# Patient Record
Sex: FEMALE | Race: WHITE | NOT HISPANIC OR LATINO
[De-identification: names, ages, dates, MRNs, and addresses within clinical notes are randomized per-mention and may not be internally consistent; named-entity substitution may affect disease eponyms.]

---

## 2017-11-29 PROBLEM — Z00.00 ENCOUNTER FOR PREVENTIVE HEALTH EXAMINATION: Status: ACTIVE | Noted: 2017-11-29

## 2017-12-14 ENCOUNTER — APPOINTMENT (OUTPATIENT)
Dept: OBGYN | Facility: CLINIC | Age: 67
End: 2017-12-14

## 2018-02-18 ENCOUNTER — EMERGENCY (EMERGENCY)
Facility: HOSPITAL | Age: 68
LOS: 0 days | Discharge: HOME | End: 2018-02-18
Attending: EMERGENCY MEDICINE

## 2018-02-18 VITALS
TEMPERATURE: 97 F | SYSTOLIC BLOOD PRESSURE: 134 MMHG | HEART RATE: 116 BPM | DIASTOLIC BLOOD PRESSURE: 87 MMHG | OXYGEN SATURATION: 99 %

## 2018-02-18 VITALS — OXYGEN SATURATION: 99 % | DIASTOLIC BLOOD PRESSURE: 96 MMHG | SYSTOLIC BLOOD PRESSURE: 128 MMHG | HEART RATE: 88 BPM
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
## 2018-02-18 DIAGNOSIS — S01.01XA LACERATION WITHOUT FOREIGN BODY OF SCALP, INITIAL ENCOUNTER: ICD-10-CM

## 2018-02-18 DIAGNOSIS — Z23 ENCOUNTER FOR IMMUNIZATION: ICD-10-CM

## 2018-02-18 DIAGNOSIS — S06.9X9A UNSPECIFIED INTRACRANIAL INJURY WITH LOSS OF CONSCIOUSNESS OF UNSPECIFIED DURATION, INITIAL ENCOUNTER: ICD-10-CM
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
## 2018-02-18 LAB
ALBUMIN SERPL ELPH-MCNC: 4.1 G/DL — SIGNIFICANT CHANGE UP (ref 3–5.5)
ALP SERPL-CCNC: 86 U/L — SIGNIFICANT CHANGE UP (ref 30–115)
ALT FLD-CCNC: 15 U/L — SIGNIFICANT CHANGE UP (ref 0–41)
ANION GAP SERPL CALC-SCNC: 9 MMOL/L — SIGNIFICANT CHANGE UP (ref 7–14)
APTT BLD: 17.7 SEC — CRITICAL LOW (ref 27–39.2)
AST SERPL-CCNC: 17 U/L — SIGNIFICANT CHANGE UP (ref 0–41)
BILIRUB SERPL-MCNC: 0.9 MG/DL — SIGNIFICANT CHANGE UP (ref 0.2–1.2)
BUN SERPL-MCNC: 16 MG/DL — SIGNIFICANT CHANGE UP (ref 10–20)
CALCIUM SERPL-MCNC: 9.4 MG/DL — SIGNIFICANT CHANGE UP (ref 8.5–10.1)
CHLORIDE SERPL-SCNC: 106 MMOL/L — SIGNIFICANT CHANGE UP (ref 98–110)
CO2 SERPL-SCNC: 21 MMOL/L — SIGNIFICANT CHANGE UP (ref 17–32)
CREAT SERPL-MCNC: 0.6 MG/DL — LOW (ref 0.7–1.5)
ETHANOL SERPL-MCNC: 7 MG/DL — HIGH
GLUCOSE SERPL-MCNC: 95 MG/DL — SIGNIFICANT CHANGE UP (ref 70–110)
INR BLD: 1.05 RATIO — SIGNIFICANT CHANGE UP (ref 0.65–1.3)
POTASSIUM SERPL-MCNC: 4.8 MMOL/L — SIGNIFICANT CHANGE UP (ref 3.5–5)
POTASSIUM SERPL-SCNC: 4.8 MMOL/L — SIGNIFICANT CHANGE UP (ref 3.5–5)
PROT SERPL-MCNC: 6.8 G/DL — SIGNIFICANT CHANGE UP (ref 6–8)
PROTHROM AB SERPL-ACNC: 11.4 SEC — SIGNIFICANT CHANGE UP (ref 9.95–12.87)
SODIUM SERPL-SCNC: 136 MMOL/L — SIGNIFICANT CHANGE UP (ref 135–146)
TYPE + AB SCN PNL BLD: SIGNIFICANT CHANGE UP

## 2018-02-18 RX ORDER — SODIUM CHLORIDE 9 MG/ML
1000 INJECTION INTRAMUSCULAR; INTRAVENOUS; SUBCUTANEOUS ONCE
Qty: 0 | Refills: 0 | Status: COMPLETED | OUTPATIENT
Start: 2018-02-18 | End: 2018-02-18

## 2018-02-18 RX ORDER — ONDANSETRON 8 MG/1
4 TABLET, FILM COATED ORAL ONCE
Qty: 0 | Refills: 0 | Status: COMPLETED | OUTPATIENT
Start: 2018-02-18 | End: 2018-02-18

## 2018-02-18 RX ORDER — TETANUS TOXOID, REDUCED DIPHTHERIA TOXOID AND ACELLULAR PERTUSSIS VACCINE, ADSORBED 5; 2.5; 8; 8; 2.5 [IU]/.5ML; [IU]/.5ML; UG/.5ML; UG/.5ML; UG/.5ML
0.5 SUSPENSION INTRAMUSCULAR ONCE
Qty: 0 | Refills: 0 | Status: COMPLETED | OUTPATIENT
Start: 2018-02-18 | End: 2018-02-18

## 2018-02-18 RX ADMIN — SODIUM CHLORIDE 1000 MILLILITER(S): 9 INJECTION INTRAMUSCULAR; INTRAVENOUS; SUBCUTANEOUS at 15:46

## 2018-02-18 RX ADMIN — ONDANSETRON 4 MILLIGRAM(S): 8 TABLET, FILM COATED ORAL at 11:11

## 2018-02-18 NOTE — ED PROVIDER NOTE - PROGRESS NOTE DETAILS
obsereved in ED for several hours, cough resolved, breaht sounds are nml and oxygen is nml. no signs of ards or respiratory distress, she tolerated po here and scans are nml . will d/c

## 2018-02-18 NOTE — ED PROVIDER NOTE - NS ED ROS FT
Review of Systems:  	•	CONSTITUTIONAL - no fever, no diaphoresis, no chills  	•	EYES - no eye pain, no blurry vision  	•	ENT - no change in hearing, no sore throat, no ear pain or tinnitus  	•	RESPIRATORY - no shortness of breath, no cough  	•	CARDIAC - no chest pain, no palpitations  	•	GI - no abd pain, no nausea, no vomiting, no diarrhea, no constipation  	•	MUSCULOSKELETAL - no joint paint, no swelling, no redness  	•	NEUROLOGIC - +LOC, head injury

## 2018-02-18 NOTE — ED ADULT NURSE NOTE - OBJECTIVE STATEMENT
Pt s/p fall from [facility], pt biligerant at this time, ETOH intox, refusing IV line and CT Scan

## 2018-02-18 NOTE — ED PROVIDER NOTE - MEDICAL DECISION MAKING DETAILS
hx of mental health disorder unspecified, smoker had fall today backwards but patient denies it, she states she was eating bagel at the time, there was reported LOC, afterwards there was vomiting, and patient was spitting up her bagel, here she continues to cough and spit up particles of food, there is some rhonchi, she is aox3, follows commands she has lac to posterior forehead, no neck pain, xray shows lucency under left diaphragm, given luceny and vomiting will get head ct, c spine, and abd pelvis, patient likely has aspirated so will admit for monitoring of ards. hx of mental health disorder unspecified, smoker had fall today backwards but patient denies it, she states she was eating bagel at the time, there was reported LOC, afterwards there was vomiting, and patient was spitting up her bagel, here she continues to cough and spit up particles of food, there is some rhonchi, she is aox3, follows commands she has lac to posterior forehead, no neck pain, xray shows lucency under left diaphragm, given luceny and vomiting will get head ct, c spine, and abd pelvis, patient is being watched for 4-6 hrs if no hypoxia and breathign improved will d/c but if patient becomes hypoxic or has trouble breathing will consider admission.

## 2018-02-18 NOTE — ED ADULT NURSE REASSESSMENT NOTE - NS ED NURSE REASSESS COMMENT FT1
Pt BIBA s/p fall, ETOH, non compliant at this time, pt refusing all treatment, refusing labs and refusing all medications, pt educated and still refusing care at this time.

## 2018-02-18 NOTE — ED PROVIDER NOTE - PHYSICAL EXAMINATION
Alert, NAD, WDWN, non-toxic appearing  PERRL, EOMI, normal pupils, no icterus, normal external ENT, pink/moist membranes  Airway intact, Lungs CTAB, normal resp effort w/o tachypnea, speaking full sentences, no wheezing or rhonchi  CVS1S2, RRR, no m/g/r, no JVD, 2+ pulses b/l, no LE edema or unilateral swelling, warm/well-perfused  Abdomen soft, no tender/dist/guard/rebound, no CVA tenderness  FROM all 4 ext, no bony tenderness or obvious deformities, no calf tenderness  Skin warm/dry, +occipital skin avulsion  AAOx3, CN 2-12 intact, normal motor, normal sensory, normal gait

## 2018-02-18 NOTE — ED PROVIDER NOTE - OBJECTIVE STATEMENT
67 yr old female, [facility] mental health disorder and dementia, presenting with mechanical fall, head injury to the back of the head, BIBA from [facility], +LOC as per witness at the NH, denies any blood thinners, patient is screaming in triage, refusing all interventions, denies headache, neck pain, chest pain, abd pain, N/V/D, or dizziness

## 2018-05-09 ENCOUNTER — APPOINTMENT (OUTPATIENT)
Dept: OBGYN | Facility: CLINIC | Age: 68
End: 2018-05-09

## 2018-12-07 ENCOUNTER — INPATIENT (INPATIENT)
Facility: HOSPITAL | Age: 68
LOS: 6 days | End: 2018-12-14
Attending: INTERNAL MEDICINE | Admitting: INTERNAL MEDICINE

## 2018-12-07 VITALS — HEART RATE: 66 BPM | OXYGEN SATURATION: 100 %

## 2018-12-07 LAB
ALBUMIN SERPL ELPH-MCNC: 3.8 G/DL — SIGNIFICANT CHANGE UP (ref 3.5–5.2)
ALP SERPL-CCNC: 99 U/L — SIGNIFICANT CHANGE UP (ref 30–115)
ALT FLD-CCNC: 28 U/L — SIGNIFICANT CHANGE UP (ref 0–41)
ANION GAP SERPL CALC-SCNC: 26 MMOL/L — HIGH (ref 7–14)
APPEARANCE UR: CLEAR — SIGNIFICANT CHANGE UP
APTT BLD: 28.7 SEC — SIGNIFICANT CHANGE UP (ref 27–39.2)
AST SERPL-CCNC: 41 U/L — SIGNIFICANT CHANGE UP (ref 0–41)
BASE EXCESS BLDA CALC-SCNC: -4.8 MMOL/L — LOW (ref -2–2)
BASOPHILS # BLD AUTO: 0.06 K/UL — SIGNIFICANT CHANGE UP (ref 0–0.2)
BASOPHILS NFR BLD AUTO: 0.7 % — SIGNIFICANT CHANGE UP (ref 0–1)
BILIRUB SERPL-MCNC: <0.2 MG/DL — SIGNIFICANT CHANGE UP (ref 0.2–1.2)
BILIRUB UR-MCNC: NEGATIVE — SIGNIFICANT CHANGE UP
BUN SERPL-MCNC: 11 MG/DL — SIGNIFICANT CHANGE UP (ref 10–20)
CALCIUM SERPL-MCNC: 8.7 MG/DL — SIGNIFICANT CHANGE UP (ref 8.5–10.1)
CHLORIDE SERPL-SCNC: 99 MMOL/L — SIGNIFICANT CHANGE UP (ref 98–110)
CK MB CFR SERPL CALC: 11.7 NG/ML — HIGH (ref 0.6–6.3)
CK SERPL-CCNC: 555 U/L — HIGH (ref 0–225)
CO2 SERPL-SCNC: 16 MMOL/L — LOW (ref 17–32)
COLOR SPEC: YELLOW — SIGNIFICANT CHANGE UP
CREAT SERPL-MCNC: 0.9 MG/DL — SIGNIFICANT CHANGE UP (ref 0.7–1.5)
DIFF PNL FLD: ABNORMAL
EOSINOPHIL # BLD AUTO: 0.11 K/UL — SIGNIFICANT CHANGE UP (ref 0–0.7)
EOSINOPHIL NFR BLD AUTO: 1.4 % — SIGNIFICANT CHANGE UP (ref 0–8)
GAS PNL BLDA: SIGNIFICANT CHANGE UP
GAS PNL BLDA: SIGNIFICANT CHANGE UP
GAS PNL BLDV: SIGNIFICANT CHANGE UP
GAS PNL BLDV: SIGNIFICANT CHANGE UP
GLUCOSE BLDC GLUCOMTR-MCNC: 152 MG/DL — HIGH (ref 70–99)
GLUCOSE SERPL-MCNC: 226 MG/DL — HIGH (ref 70–99)
GLUCOSE UR QL: 100 MG/DL
HCO3 BLDA-SCNC: 22 MMOL/L — LOW (ref 23–27)
HCT VFR BLD CALC: 38 % — SIGNIFICANT CHANGE UP (ref 37–47)
HCT VFR BLD CALC: 38.2 % — SIGNIFICANT CHANGE UP (ref 37–47)
HGB BLD-MCNC: 12.3 G/DL — SIGNIFICANT CHANGE UP (ref 12–16)
HGB BLD-MCNC: 13.1 G/DL — SIGNIFICANT CHANGE UP (ref 12–16)
IMM GRANULOCYTES NFR BLD AUTO: 2.7 % — HIGH (ref 0.1–0.3)
INR BLD: 1.11 RATIO — SIGNIFICANT CHANGE UP (ref 0.65–1.3)
KETONES UR-MCNC: ABNORMAL
LACTATE SERPL-SCNC: 1.7 MMOL/L — SIGNIFICANT CHANGE UP (ref 0.5–2.2)
LEUKOCYTE ESTERASE UR-ACNC: NEGATIVE — SIGNIFICANT CHANGE UP
LIDOCAIN IGE QN: 40 U/L — SIGNIFICANT CHANGE UP (ref 7–60)
LYMPHOCYTES # BLD AUTO: 3.16 K/UL — SIGNIFICANT CHANGE UP (ref 1.2–3.4)
LYMPHOCYTES # BLD AUTO: 38.9 % — SIGNIFICANT CHANGE UP (ref 20.5–51.1)
MCHC RBC-ENTMCNC: 30.5 PG — SIGNIFICANT CHANGE UP (ref 27–31)
MCHC RBC-ENTMCNC: 30.7 PG — SIGNIFICANT CHANGE UP (ref 27–31)
MCHC RBC-ENTMCNC: 32.4 G/DL — SIGNIFICANT CHANGE UP (ref 32–37)
MCHC RBC-ENTMCNC: 34.3 G/DL — SIGNIFICANT CHANGE UP (ref 32–37)
MCV RBC AUTO: 89 FL — SIGNIFICANT CHANGE UP (ref 81–99)
MCV RBC AUTO: 94.8 FL — SIGNIFICANT CHANGE UP (ref 81–99)
MONOCYTES # BLD AUTO: 0.69 K/UL — HIGH (ref 0.1–0.6)
MONOCYTES NFR BLD AUTO: 8.5 % — SIGNIFICANT CHANGE UP (ref 1.7–9.3)
NEUTROPHILS # BLD AUTO: 3.89 K/UL — SIGNIFICANT CHANGE UP (ref 1.4–6.5)
NEUTROPHILS NFR BLD AUTO: 47.8 % — SIGNIFICANT CHANGE UP (ref 42.2–75.2)
NITRITE UR-MCNC: NEGATIVE — SIGNIFICANT CHANGE UP
NRBC # BLD: 0 /100 WBCS — SIGNIFICANT CHANGE UP (ref 0–0)
PCO2 BLDA: 46 MMHG — HIGH (ref 38–42)
PH BLDA: 7.29 — LOW (ref 7.38–7.42)
PH UR: 6 — SIGNIFICANT CHANGE UP (ref 5–8)
PLATELET # BLD AUTO: 230 K/UL — SIGNIFICANT CHANGE UP (ref 130–400)
PLATELET # BLD AUTO: 246 K/UL — SIGNIFICANT CHANGE UP (ref 130–400)
PO2 BLDA: 86 MMHG — SIGNIFICANT CHANGE UP (ref 78–95)
POTASSIUM SERPL-MCNC: 4.4 MMOL/L — SIGNIFICANT CHANGE UP (ref 3.5–5)
POTASSIUM SERPL-SCNC: 4.4 MMOL/L — SIGNIFICANT CHANGE UP (ref 3.5–5)
PROT SERPL-MCNC: 6.3 G/DL — SIGNIFICANT CHANGE UP (ref 6–8)
PROT UR-MCNC: 100 MG/DL
PROTHROM AB SERPL-ACNC: 12.8 SEC — SIGNIFICANT CHANGE UP (ref 9.95–12.87)
RBC # BLD: 4.01 M/UL — LOW (ref 4.2–5.4)
RBC # BLD: 4.29 M/UL — SIGNIFICANT CHANGE UP (ref 4.2–5.4)
RBC # FLD: 12.8 % — SIGNIFICANT CHANGE UP (ref 11.5–14.5)
RBC # FLD: 12.9 % — SIGNIFICANT CHANGE UP (ref 11.5–14.5)
SAO2 % BLDA: 95 % — SIGNIFICANT CHANGE UP (ref 94–98)
SODIUM SERPL-SCNC: 141 MMOL/L — SIGNIFICANT CHANGE UP (ref 135–146)
SP GR SPEC: 1.02 — SIGNIFICANT CHANGE UP (ref 1.01–1.03)
TROPONIN T SERPL-MCNC: 0.07 NG/ML — CRITICAL HIGH
TROPONIN T SERPL-MCNC: <0.01 NG/ML — SIGNIFICANT CHANGE UP
TYPE + AB SCN PNL BLD: SIGNIFICANT CHANGE UP
UROBILINOGEN FLD QL: 0.2 MG/DL — SIGNIFICANT CHANGE UP (ref 0.2–0.2)
WBC # BLD: 18.59 K/UL — HIGH (ref 4.8–10.8)
WBC # BLD: 8.13 K/UL — SIGNIFICANT CHANGE UP (ref 4.8–10.8)
WBC # FLD AUTO: 18.59 K/UL — HIGH (ref 4.8–10.8)
WBC # FLD AUTO: 8.13 K/UL — SIGNIFICANT CHANGE UP (ref 4.8–10.8)

## 2018-12-07 RX ORDER — OLANZAPINE 15 MG/1
0 TABLET, FILM COATED ORAL
Qty: 0 | Refills: 0 | COMMUNITY

## 2018-12-07 RX ORDER — HALOPERIDOL DECANOATE 100 MG/ML
2 INJECTION INTRAMUSCULAR
Qty: 0 | Refills: 0 | Status: DISCONTINUED | OUTPATIENT
Start: 2018-12-07 | End: 2018-12-08

## 2018-12-07 RX ORDER — DOCUSATE SODIUM 100 MG
1 CAPSULE ORAL
Qty: 0 | Refills: 0 | COMMUNITY

## 2018-12-07 RX ORDER — DICLOFENAC SODIUM 30 MG/G
0 GEL TOPICAL
Qty: 0 | Refills: 0 | COMMUNITY

## 2018-12-07 RX ORDER — HALOPERIDOL DECANOATE 100 MG/ML
2 INJECTION INTRAMUSCULAR
Qty: 0 | Refills: 0 | Status: DISCONTINUED | OUTPATIENT
Start: 2018-12-07 | End: 2018-12-07

## 2018-12-07 RX ORDER — ENOXAPARIN SODIUM 100 MG/ML
40 INJECTION SUBCUTANEOUS DAILY
Qty: 0 | Refills: 0 | Status: DISCONTINUED | OUTPATIENT
Start: 2018-12-07 | End: 2018-12-12

## 2018-12-07 RX ORDER — PANTOPRAZOLE SODIUM 20 MG/1
40 TABLET, DELAYED RELEASE ORAL
Qty: 0 | Refills: 0 | Status: DISCONTINUED | OUTPATIENT
Start: 2018-12-07 | End: 2018-12-08

## 2018-12-07 RX ORDER — CITALOPRAM 10 MG/1
1 TABLET, FILM COATED ORAL
Qty: 0 | Refills: 0 | COMMUNITY

## 2018-12-07 RX ORDER — BENZTROPINE MESYLATE 1 MG
0 TABLET ORAL
Qty: 0 | Refills: 0 | COMMUNITY

## 2018-12-07 RX ORDER — MELOXICAM 15 MG/1
1 TABLET ORAL
Qty: 0 | Refills: 0 | COMMUNITY

## 2018-12-07 RX ORDER — MIDAZOLAM HYDROCHLORIDE 1 MG/ML
0.02 INJECTION, SOLUTION INTRAMUSCULAR; INTRAVENOUS
Qty: 100 | Refills: 0 | Status: DISCONTINUED | OUTPATIENT
Start: 2018-12-07 | End: 2018-12-08

## 2018-12-07 RX ORDER — SODIUM CHLORIDE 9 MG/ML
1000 INJECTION INTRAMUSCULAR; INTRAVENOUS; SUBCUTANEOUS ONCE
Qty: 0 | Refills: 0 | Status: COMPLETED | OUTPATIENT
Start: 2018-12-07 | End: 2018-12-07

## 2018-12-07 RX ORDER — PANTOPRAZOLE SODIUM 20 MG/1
1 TABLET, DELAYED RELEASE ORAL
Qty: 0 | Refills: 0 | COMMUNITY

## 2018-12-07 RX ORDER — CITALOPRAM 10 MG/1
0 TABLET, FILM COATED ORAL
Qty: 0 | Refills: 0 | COMMUNITY

## 2018-12-07 RX ORDER — SIMVASTATIN 20 MG/1
0 TABLET, FILM COATED ORAL
Qty: 0 | Refills: 0 | COMMUNITY

## 2018-12-07 RX ORDER — SODIUM CHLORIDE 9 MG/ML
1000 INJECTION, SOLUTION INTRAVENOUS ONCE
Qty: 0 | Refills: 0 | Status: COMPLETED | OUTPATIENT
Start: 2018-12-07 | End: 2018-12-07

## 2018-12-07 RX ORDER — HALOPERIDOL DECANOATE 100 MG/ML
0 INJECTION INTRAMUSCULAR
Qty: 0 | Refills: 0 | COMMUNITY

## 2018-12-07 RX ORDER — OLANZAPINE 15 MG/1
5 TABLET, FILM COATED ORAL
Qty: 0 | Refills: 0 | Status: DISCONTINUED | OUTPATIENT
Start: 2018-12-07 | End: 2018-12-10

## 2018-12-07 RX ORDER — DOCUSATE SODIUM 100 MG
100 CAPSULE ORAL
Qty: 0 | Refills: 0 | Status: DISCONTINUED | OUTPATIENT
Start: 2018-12-07 | End: 2018-12-08

## 2018-12-07 RX ORDER — BENZTROPINE MESYLATE 1 MG
1 TABLET ORAL
Qty: 0 | Refills: 0 | Status: DISCONTINUED | OUTPATIENT
Start: 2018-12-07 | End: 2018-12-10

## 2018-12-07 RX ORDER — HALOPERIDOL DECANOATE 100 MG/ML
75 INJECTION INTRAMUSCULAR
Qty: 0 | Refills: 0 | COMMUNITY

## 2018-12-07 RX ORDER — HALOPERIDOL DECANOATE 100 MG/ML
2 INJECTION INTRAMUSCULAR
Qty: 0 | Refills: 0 | COMMUNITY

## 2018-12-07 RX ORDER — SIMVASTATIN 20 MG/1
1 TABLET, FILM COATED ORAL
Qty: 0 | Refills: 0 | COMMUNITY

## 2018-12-07 RX ORDER — MELOXICAM 15 MG/1
0 TABLET ORAL
Qty: 0 | Refills: 0 | COMMUNITY

## 2018-12-07 RX ORDER — ONDANSETRON 8 MG/1
4 TABLET, FILM COATED ORAL ONCE
Qty: 0 | Refills: 0 | Status: COMPLETED | OUTPATIENT
Start: 2018-12-07 | End: 2018-12-09

## 2018-12-07 RX ORDER — FENTANYL CITRATE 50 UG/ML
0.5 INJECTION INTRAVENOUS
Qty: 2500 | Refills: 0 | Status: DISCONTINUED | OUTPATIENT
Start: 2018-12-07 | End: 2018-12-08

## 2018-12-07 RX ORDER — CHOLECALCIFEROL (VITAMIN D3) 125 MCG
1 CAPSULE ORAL
Qty: 0 | Refills: 0 | COMMUNITY

## 2018-12-07 RX ORDER — SIMVASTATIN 20 MG/1
40 TABLET, FILM COATED ORAL AT BEDTIME
Qty: 0 | Refills: 0 | Status: DISCONTINUED | OUTPATIENT
Start: 2018-12-07 | End: 2018-12-11

## 2018-12-07 RX ORDER — BENZTROPINE MESYLATE 1 MG
1 TABLET ORAL
Qty: 0 | Refills: 0 | COMMUNITY

## 2018-12-07 RX ORDER — OLANZAPINE 15 MG/1
1 TABLET, FILM COATED ORAL
Qty: 0 | Refills: 0 | COMMUNITY

## 2018-12-07 RX ORDER — CHOLECALCIFEROL (VITAMIN D3) 125 MCG
1000 CAPSULE ORAL DAILY
Qty: 0 | Refills: 0 | Status: DISCONTINUED | OUTPATIENT
Start: 2018-12-07 | End: 2018-12-11

## 2018-12-07 RX ORDER — CITALOPRAM 10 MG/1
20 TABLET, FILM COATED ORAL DAILY
Qty: 0 | Refills: 0 | Status: DISCONTINUED | OUTPATIENT
Start: 2018-12-07 | End: 2018-12-10

## 2018-12-07 RX ADMIN — Medication 1 MILLIGRAM(S): at 19:50

## 2018-12-07 RX ADMIN — Medication 1000 UNIT(S): at 19:50

## 2018-12-07 RX ADMIN — SODIUM CHLORIDE 1000 MILLILITER(S): 9 INJECTION, SOLUTION INTRAVENOUS at 12:00

## 2018-12-07 RX ADMIN — ENOXAPARIN SODIUM 40 MILLIGRAM(S): 100 INJECTION SUBCUTANEOUS at 19:49

## 2018-12-07 RX ADMIN — SODIUM CHLORIDE 1000 MILLILITER(S): 9 INJECTION INTRAMUSCULAR; INTRAVENOUS; SUBCUTANEOUS at 09:14

## 2018-12-07 RX ADMIN — FENTANYL CITRATE 3.5 MICROGRAM(S)/KG/HR: 50 INJECTION INTRAVENOUS at 09:13

## 2018-12-07 RX ADMIN — MIDAZOLAM HYDROCHLORIDE 1.4 MG/KG/HR: 1 INJECTION, SOLUTION INTRAMUSCULAR; INTRAVENOUS at 09:13

## 2018-12-07 RX ADMIN — MIDAZOLAM HYDROCHLORIDE 1.4 MG/KG/HR: 1 INJECTION, SOLUTION INTRAMUSCULAR; INTRAVENOUS at 18:00

## 2018-12-07 RX ADMIN — SIMVASTATIN 40 MILLIGRAM(S): 20 TABLET, FILM COATED ORAL at 21:10

## 2018-12-07 RX ADMIN — CITALOPRAM 20 MILLIGRAM(S): 10 TABLET, FILM COATED ORAL at 19:50

## 2018-12-07 RX ADMIN — OLANZAPINE 5 MILLIGRAM(S): 15 TABLET, FILM COATED ORAL at 19:50

## 2018-12-07 RX ADMIN — HALOPERIDOL DECANOATE 2 MILLIGRAM(S): 100 INJECTION INTRAMUSCULAR at 21:57

## 2018-12-07 NOTE — CHART NOTE - NSCHARTNOTEFT_GEN_A_CORE
Call to bedside for tonic colonic seizure like activity undetermined amount of time associated with 1 ep on non billious non bloody vomitus.  Will followup Neuro cs, BMP, CBC, ABG, CXR, CTHnoncon, REEG, and ekg.  Patient will be made NPO with low continues wall suction, Increased versed, with aspiration precautions, and frequent neuro checks. seizure precautions.  Plan discussed with senior.     ICU Vital Signs Last 24 Hrs  T(C): 36.4 (07 Dec 2018 18:04), Max: 36.4 (07 Dec 2018 18:04)  T(F): 97.6 (07 Dec 2018 18:04), Max: 97.6 (07 Dec 2018 18:04)  HR: 78 (07 Dec 2018 21:30) (62 - 125)  BP: 118/54 (07 Dec 2018 21:30) (88/60 - 145/66)  BP(mean): 76 (07 Dec 2018 21:30) (63 - 84)  RR: 17 (07 Dec 2018 21:30) (17 - 23)  SpO2: 100% (07 Dec 2018 21:30) (99% - 100%)    PHYSICAL EXAM:  General: sedated , intubated     HEENT: Pupils equal, reactive to light symmetrically.    PULM: Clear to auscultation bilaterally, no significant sputum production.    CVS: Regular rate and rhythm, no murmurs, rubs, or gallops.    ABD: Soft, nondistended, nontender, no masses.    EXT: No edema, nontender.    SKIN: Warm and well perfused, no rashes noted. Call to bedside for tonic colonic seizure like activity undetermined amount of time associated with 1 ep on non billious non bloody vomitus.  Will followup Neuro cs, BMP, mag, phos CBC, CE,  ABG, CXR, CTHnoncon, REEG, and ekg.  Patient will be made NPO with low continues wall suction, Increased versed, with aspiration precautions, and frequent neuro checks. seizure precautions.  Plan discussed with senior. Spoke with neurology team will start Keppra IV 1g BID and VEEG in AM.     ICU Vital Signs Last 24 Hrs  T(C): 36.4 (07 Dec 2018 18:04), Max: 36.4 (07 Dec 2018 18:04)  T(F): 97.6 (07 Dec 2018 18:04), Max: 97.6 (07 Dec 2018 18:04)  HR: 78 (07 Dec 2018 21:30) (62 - 125)  BP: 118/54 (07 Dec 2018 21:30) (88/60 - 145/66)  BP(mean): 76 (07 Dec 2018 21:30) (63 - 84)  RR: 17 (07 Dec 2018 21:30) (17 - 23)  SpO2: 100% (07 Dec 2018 21:30) (99% - 100%)    PHYSICAL EXAM:  General: sedated , intubated     HEENT: Pupils equal, reactive to light symmetrically.    PULM: Clear to auscultation bilaterally, no significant sputum production.    CVS: Regular rate and rhythm, no murmurs, rubs, or gallops.    ABD: Soft, nondistended, nontender, no masses.    EXT: No edema, nontender.    SKIN: Warm and well perfused, no rashes noted.

## 2018-12-07 NOTE — ED PROVIDER NOTE - ATTENDING CONTRIBUTION TO CARE
I was present for and supervised the key and critical aspects of the procedures performed during the care of the patient. Patient is a 66 yo f who presents unresponsive in cardiac arrest, s/p cpr and intubation prehospital after a choking episode, s/p heimlich.   Patient received 1 dose of epi but per medics once intubated rosc achieved. on physical exam vital signs stable, patient intubated, rrr s1s2 noted, ext- patient intubated unable to fully comply with neuro exam based on intubation  A/P- patient was choking at her facility, heimlich performed, cpr performed pre-hospital who intubated and achieved rosc after 1 does of epi and intubation. esitmated down time 10 min,   Patient stable at this time. we placed patient on monitor, iv, given sedation we confirmed tube placement, obtain cxr, and will admit for further management in addition started iv clindamycin for potential aspiration

## 2018-12-07 NOTE — ED ADULT NURSE REASSESSMENT NOTE - NS ED NURSE REASSESS COMMENT FT1
pt transported to icu with RN, respiratory, and transport.  PT drips infusing as per order. Rass score (-1).  ET tube in place, moody in place.  pt transported safely.

## 2018-12-07 NOTE — H&P ADULT - PMH
Depressed    GERD (gastroesophageal reflux disease)    High cholesterol    HTN (hypertension)    Schizophrenia    Tardive dyskinesia Depressed    GERD (gastroesophageal reflux disease)    High cholesterol    Schizophrenia    Tardive dyskinesia

## 2018-12-07 NOTE — H&P ADULT - HISTORY OF PRESENT ILLNESS
66 y/o F, with PMH listed below including HTN and schizophrenia, brought to the ED by EMS after cardiac arrest. On day of presentation, she was eating a bagel when she choked and collapsed. Heimlich maneuver was performed and CPR was initiated as she was pulseless. EMS arrived after around 10 mins. ROSC was established after pushing 1 dose of epi. She was intubated on site and brought to ED. in 66 y/o F, with PMH listed below including HTN and schizophrenia, lives at [facility], brought to the ED by EMS after cardiac arrest. On day of presentation, she was eating a bagel when she choked and collapsed. Heimlich maneuver was performed and CPR was initiated as she was pulseless. EMS arrived after around 10 mins. ROSC was established after pushing 1 dose of epi. She was intubated on site and brought to ED.   spoke to patients cousin's wife (cousin is HCP), patient is a chronic smoker 1ppd for many years.

## 2018-12-07 NOTE — CONSULT NOTE ADULT - SUBJECTIVE AND OBJECTIVE BOX
Patient is a 67y old  Female who presents with a chief complaint of cardiac arrest.    HPI: 68 y/o F, with HTN and schizophrenia, brought to the ED by EMS after cardiac arrest. On day of presentation, she was eating a bagel when she choked and collapsed. Heimlich maneuver was performed and CPR was initiated. EMS arrived after around 10 mins. ROSC was established after pushing 1 dose of epi. She was intubated on site.        PAST MEDICAL & SURGICAL HISTORY:  Depressed  High cholesterol  Tardive dyskinesia  Schizophrenia  GERD (gastroesophageal reflux disease)  HTN (hypertension)  No significant past surgical history      SOCIAL HX:   Smoking     neg                    ETOH       neg                     Other   neg    FAMILY HISTORY:  No pertinent family history in first degree relatives      ROS:  See HPI     Allergies  No Known Allergies            PHYSICAL EXAM    ICU Vital Signs Last 24 Hrs  T(C): 34.8 (07 Dec 2018 13:10), Max: 35.1 (07 Dec 2018 09:30)  T(F): 94.6 (07 Dec 2018 13:10), Max: 95.2 (07 Dec 2018 09:30)  HR: 75 (07 Dec 2018 13:10) (62 - 125)  BP: 138/63 (07 Dec 2018 13:10) (88/60 - 145/66)  BP(mean): 63 (07 Dec 2018 13:10) (63 - 63)  RR: 17 (07 Dec 2018 13:10) (17 - 20)  SpO2: 100% (07 Dec 2018 09:30) (100% - 100%)      General: intubated   HEENT:  RANDALL              Lymph Nodes: No cervical LN   Lungs: Bilateral BS, clear, no wheezing, no rhonchi  Cardiovascular: Regular  Abdomen: Soft, Positive BS  Extremities: No clubbing  Skin: Warm  Neurological: Non focal, positive Gag and pupillary reflexes        LABS:                          12.3   8.13  )-----------( 230      ( 07 Dec 2018 08:47 )             38.0                                               12-    141  |  99  |  11  ----------------------------<  226<H>  4.4   |  16<L>  |  0.9    Ca    8.7      07 Dec 2018 08:47    TPro  6.3  /  Alb  3.8  /  TBili  <0.2  /  DBili  x   /  AST  41  /  ALT  28  /  AlkPhos  99  12-07      PT/INR - ( 07 Dec 2018 08:47 )   PT: 12.80 sec;   INR: 1.11 ratio         PTT - ( 07 Dec 2018 08:47 )  PTT:28.7 sec                                       Urinalysis Basic - ( 07 Dec 2018 08:47 )    Color: Yellow / Appearance: Clear / S.020 / pH: x  Gluc: x / Ketone: Trace  / Bili: Negative / Urobili: 0.2 mg/dL   Blood: x / Protein: 100 mg/dL / Nitrite: Negative   Leuk Esterase: Negative / RBC: 6-10 /HPF / WBC x   Sq Epi: x / Non Sq Epi: Occasional /HPF / Bacteria: x        CARDIAC MARKERS ( 07 Dec 2018 08:47 )  x     / <0.01 ng/mL / x     / x     / x                                                LIVER FUNCTIONS - ( 07 Dec 2018 08:47 )  Alb: 3.8 g/dL / Pro: 6.3 g/dL / ALK PHOS: 99 U/L / ALT: 28 U/L / AST: 41 U/L / GGT: x                                                                                               Mode: AC/ CMV (Assist Control/ Continuous Mandatory Ventilation)  RR (machine): 16  TV (machine): 450  FiO2: 40  PEEP: 5  ITime: 1  MAP: 16  PIP: 45                                      ABG - ( 07 Dec 2018 08:54 )  pH, Arterial: 7.26  pH, Blood: x     /  pCO2: 28    /  pO2: 382   / HCO3: 12    / Base Excess: -13.0 /  SaO2: 100                 X-Rays      ET ok                  elevated left hemidiaphragm                  otherwise unremarkable                                                                             MEDICATIONS  (STANDING):  fentaNYL   Infusion. 0.5 MICROgram(s)/kG/Hr (3.5 mL/Hr) IV Continuous <Continuous>  lactated ringers Bolus 1000 milliLiter(s) IV Bolus once  midazolam Infusion 0.02 mG/kG/Hr (1.4 mL/Hr) IV Continuous <Continuous>    MEDICATIONS  (PRN):

## 2018-12-07 NOTE — PROVIDER CONTACT NOTE (OTHER) - SITUATION
Upon admission to ICU, pt presents w/ seizure-like convulsions & involuntary jerking of arms and legs & eyes opening wide during episodes

## 2018-12-07 NOTE — H&P ADULT - NSHPPHYSICALEXAM_GEN_ALL_CORE
T(F): 94.6 (12-07-18 @ 13:10), Max: 95.2 (12-07-18 @ 09:30)  HR: 75 (12-07-18 @ 13:10) (62 - 125)  BP: 138/63 (12-07-18 @ 13:10) (88/60 - 145/66)  RR: 17 (12-07-18 @ 13:10) (17 - 20)  SpO2: 100% (12-07-18 @ 09:30) (100% - 100%)    PHYSICAL EXAM:  GEN: No acute distress  HEENT:   LUNGS: Clear to auscultation bilaterally   HEART: S1/S2 present. RRR.   ABD: Soft, non-tender, non-distended. Bowel sounds present  EXT:  NEURO: AAOX3 T(F): 94.6 (12-07-18 @ 13:10), Max: 95.2 (12-07-18 @ 09:30)  HR: 75 (12-07-18 @ 13:10) (62 - 125)  BP: 138/63 (12-07-18 @ 13:10) (88/60 - 145/66)  RR: 17 (12-07-18 @ 13:10) (17 - 20)  SpO2: 100% (12-07-18 @ 09:30) (100% - 100%)    PHYSICAL EXAM:  GEN: vented, intubated, sedated  HEENT: tongue movements- chronic as per patients family  LUNGS: vented BS to auscultation bilaterally   HEART: S1/S2 present. RRR.   ABD: Soft, non-tender, non-distended  EXT: no edema  NEURO: sedated, vented

## 2018-12-07 NOTE — ED PROCEDURE NOTE - CPROC ED GASTRIC INTUB DETAIL1
Placement was confirmed by aspiration of gastric secretions./The nasogastric tube (see size above) was inserted via the anatomic location./Bowel sounds present to 4 quadrants./Gastric tube connected to low continuous suction.

## 2018-12-07 NOTE — ED PROVIDER NOTE - PROGRESS NOTE DETAILS
Patient assessed, OG tube placed, sedation started; target temperature to 94F started. Will reassess. Fluids administered for repeat lactate. Patient stable. Labs reviewed, lactate repeated. Will admit to ICU. ICU Natalia Dr. Collado and Dr. Dempsey evaluated patient at bedside, approved for ICU admission. Pending head CT. Received call from radiologist regarding ET tube placement; currently the ET tube is at 22, moved it to 20. Will repeat CXR Received call from radiologist regarding ET tube placement; stated the ET tube is currently at the dede, almost into the right main stem. Currently the ET tube is at 22, moved it to 20. Will repeat CXR

## 2018-12-07 NOTE — ED PROVIDER NOTE - OBJECTIVE STATEMENT
Patient is a 68 yo f who presents unresponsive in cardiac arrest, s/p cpr and intubation prehospital after a choking episode, s/p heimlich.   Patient received 1 dose of epi but per medics once intubated rosc achieved

## 2018-12-07 NOTE — ED ADULT NURSE NOTE - PMH
Depressed    GERD (gastroesophageal reflux disease)    High cholesterol    HTN (hypertension)    Schizophrenia    Tardive dyskinesia

## 2018-12-07 NOTE — ED ADULT NURSE REASSESSMENT NOTE - NS ED NURSE REASSESS COMMENT FT1
pt remains on fentanyl drip for pain control, et tube in place at 22cm lip line. pt suctioned as needed.  o2 sat 100%.  ICU team at bedside for eval.  no sedative infusing at this time, no obvious response to painful stimuli will continue to monitor/assess

## 2018-12-07 NOTE — PATIENT PROFILE ADULT - NSPROREFERSVCHOMEBH_GEN_A_NUR
How Severe Are Your Spot(S)?: mild
What Is The Reason For Today's Visit?: Full Body Skin Examination
What Is The Reason For Today's Visit? (Being Monitored For X): concerning skin lesions on an annual basis
no

## 2018-12-07 NOTE — H&P ADULT - NSHPLABSRESULTS_GEN_ALL_CORE
12.3   8.13  )-----------( 230      ( 07 Dec 2018 08:47 )             38.0           141  |  99  |  11  ----------------------------<  226<H>  4.4   |  16<L>  |  0.9    Ca    8.7      07 Dec 2018 08:47    TPro  6.3  /  Alb  3.8  /  TBili  <0.2  /  DBili  x   /  AST  41  /  ALT  28  /  AlkPhos  99  12          ABG - ( 07 Dec 2018 08:54 )  pH, Arterial: 7.26  pH, Blood: x     /  pCO2: 28    /  pO2: 382   / HCO3: 12    / Base Excess: -13.0 /  SaO2: 100                 Urinalysis Basic - ( 07 Dec 2018 08:47 )    Color: Yellow / Appearance: Clear / S.020 / pH: x  Gluc: x / Ketone: Trace  / Bili: Negative / Urobili: 0.2 mg/dL   Blood: x / Protein: 100 mg/dL / Nitrite: Negative   Leuk Esterase: Negative / RBC: 6-10 /HPF / WBC x   Sq Epi: x / Non Sq Epi: Occasional /HPF / Bacteria: x  PT/INR - ( 07 Dec 2018 08:47 )   PT: 12.80 sec;   INR: 1.11 ratio         PTT - ( 07 Dec 2018 08:47 )  PTT:28.7 sec      CARDIAC MARKERS ( 07 Dec 2018 08:47 )  x     / <0.01 ng/mL / x     / x     / x          POCT Blood Glucose.: 223 mg/dL (07 Dec 2018 08:43)

## 2018-12-07 NOTE — ED ADULT NURSE NOTE - NSIMPLEMENTINTERV_GEN_ALL_ED
Implemented All Fall with Harm Risk Interventions:  [place] to call system. Call bell, personal items and telephone within reach. Instruct patient to call for assistance. Room bathroom lighting operational. Non-slip footwear when patient is off stretcher. Physically safe environment: no spills, clutter or unnecessary equipment. Stretcher in lowest position, wheels locked, appropriate side rails in place. Provide visual cue, wrist band, yellow gown, etc. Monitor gait and stability. Monitor for mental status changes and reorient to person, place, and time. Review medications for side effects contributing to fall risk. Reinforce activity limits and safety measures with patient and family. Provide visual clues: red socks.

## 2018-12-07 NOTE — CONSULT NOTE ADULT - ASSESSMENT
IMPRESSION:    Acute hypoxic respiratory failure  Cardiac arrest  Asphyxiation       PLAN:    CNS: f/u CTH    HEENT: Oral care    PULMONARY:  HOB @ 45 degrees.  Vent changes as follows: no changes    CARDIOVASCULAR: I = O, repeat lactic acid    GI: GI prophylaxis.  Feeding     RENAL:  Follow up lytes.  Correct as needed    INFECTIOUS DISEASE: Follow up cultures, no abx for now     HEMATOLOGICAL:  DVT prophylaxis.    ENDOCRINE:  Follow up FS.  Insulin protocol if needed.    MUSCULOSKELETAL: reposition in bed as per schedule     Monitor in ICU

## 2018-12-07 NOTE — H&P ADULT - ASSESSMENT
66 y/o F, with PMH listed below including HTN and schizophrenia, lives at [facility], brought to the ED by EMS after cardiac arrest. On day of presentation, she was eating a bagel when she choked and collapsed. Heimlich maneuver was performed and CPR was initiated as she was pulseless. EMS arrived after around 10 mins. ROSC was established after pushing 1 dose of epi. She was intubated on site and brought to ED.     1- Acute hypoxemic respiratory failure  cardiac arrest secondary to foreign body airway obstruction after choking while eating bagel  bagel seen in pharynx during intubation as per EMS    on 40%, peep-5, 450 tv, rr 16  sedated with fentanyl and versed  cxr- et tube in place  CTH negative    if spikes a fever or wbc trending up, consider augmentin for asp pna    2- schizophrenia  c/w haloperidol and olanzapine    3- depression  c/w citalopram    4- gerd- c/w ppi    5- chronic tardive dyskinesia due to antidopaminergic meds  ;ip smacking tongue movements chronic as per family  unlikely due to seizures    dvt ppx- lovenox 40 q24    npo except meds for now 68 y/o F, with PMH listed below including HTN and schizophrenia, lives at [facility], brought to the ED by EMS after cardiac arrest. On day of presentation, she was eating a bagel when she choked and collapsed. Heimlich maneuver was performed and CPR was initiated as she was pulseless. EMS arrived after around 10 mins. ROSC was established after pushing 1 dose of epi. She was intubated on site and brought to ED.     1- Acute hypoxemic respiratory failure  cardiac arrest secondary to foreign body airway obstruction after choking while eating bagel  bagel seen in pharynx during intubation as per EMS    on 40%, peep-5, 450 tv, rr 16  sedated with fentanyl and versed  cxr- et tube in place  CTH negative    if spikes a fever or wbc trending up, consider augmentin for asp pna    2- schizophrenia  c/w haloperidol and olanzapine    3- depression  c/w citalopram    4- gerd- c/w ppi    5- chronic tardive dyskinesia due to antidopaminergic meds  ;ip smacking tongue movements chronic as per family  unlikely due to seizures    dvt ppx- lovenox 40 q24    ng tube feed

## 2018-12-07 NOTE — PROVIDER CONTACT NOTE (OTHER) - SITUATION
Tube feedings started & minutes after pt started to have a seizure-like episode w/ convulsions, vomiting up food pieces and tube feeds. BP increased to  & o2 sat dropped to 93%.

## 2018-12-07 NOTE — ED ADULT NURSE NOTE - OBJECTIVE STATEMENT
Patient went into cardiac arrest after chocking from [facility]. Patient achieved ROSC in field prior to arrival, intubated in the field.

## 2018-12-07 NOTE — ED PROVIDER NOTE - MEDICAL DECISION MAKING DETAILS
I was present for and supervised the key and critical aspects of the procedures performed during the care of the patient. Patient is a 68 yo f who presents unresponsive in cardiac arrest, s/p cpr and intubation prehospital after a choking episode, s/p heimlich.   Patient received 1 dose of epi but per medics once intubated rosc achieved. on physical exam vital signs stable, patient intubated, rrr s1s2 noted, ext- patient intubated unable to fully comply with neuro exam based on intubation  A/P- patient was choking at her facility, heimlich performed, cpr performed pre-hospital who intubated and achieved rosc after 1 does of epi and intubation. esitmated down time 10 min,   Patient stable at this time. we placed patient on monitor, iv, given sedation we confirmed tube placement, obtain cxr, and will admit for further management in addition started iv clindamycin for potential aspiration

## 2018-12-07 NOTE — PROVIDER CONTACT NOTE (OTHER) - ACTION/TREATMENT ORDERED:
MD Jordan called ot bedside. Tube feedings were stopped, Pt NGT connected to LCWS, versed increased for seizure activity. Head ct ordered. Neuro cx ordered. Chest xray ordered for possible aspiration

## 2018-12-08 LAB
ANION GAP SERPL CALC-SCNC: 16 MMOL/L — HIGH (ref 7–14)
ANION GAP SERPL CALC-SCNC: 17 MMOL/L — HIGH (ref 7–14)
BASE EXCESS BLDA CALC-SCNC: -0.4 MMOL/L — SIGNIFICANT CHANGE UP (ref -2–2)
BASE EXCESS BLDA CALC-SCNC: -1.5 MMOL/L — SIGNIFICANT CHANGE UP (ref -2–2)
BUN SERPL-MCNC: 13 MG/DL — SIGNIFICANT CHANGE UP (ref 10–20)
BUN SERPL-MCNC: 13 MG/DL — SIGNIFICANT CHANGE UP (ref 10–20)
CALCIUM SERPL-MCNC: 8.6 MG/DL — SIGNIFICANT CHANGE UP (ref 8.5–10.1)
CALCIUM SERPL-MCNC: 8.6 MG/DL — SIGNIFICANT CHANGE UP (ref 8.5–10.1)
CHLORIDE SERPL-SCNC: 101 MMOL/L — SIGNIFICANT CHANGE UP (ref 98–110)
CHLORIDE SERPL-SCNC: 103 MMOL/L — SIGNIFICANT CHANGE UP (ref 98–110)
CK MB CFR SERPL CALC: 11 NG/ML — HIGH (ref 0.6–6.3)
CK SERPL-CCNC: 578 U/L — HIGH (ref 0–225)
CO2 SERPL-SCNC: 21 MMOL/L — SIGNIFICANT CHANGE UP (ref 17–32)
CO2 SERPL-SCNC: 22 MMOL/L — SIGNIFICANT CHANGE UP (ref 17–32)
CREAT SERPL-MCNC: 0.5 MG/DL — LOW (ref 0.7–1.5)
CREAT SERPL-MCNC: 0.6 MG/DL — LOW (ref 0.7–1.5)
GLUCOSE BLDC GLUCOMTR-MCNC: 111 MG/DL — HIGH (ref 70–99)
GLUCOSE BLDC GLUCOMTR-MCNC: 118 MG/DL — HIGH (ref 70–99)
GLUCOSE BLDC GLUCOMTR-MCNC: 127 MG/DL — HIGH (ref 70–99)
GLUCOSE SERPL-MCNC: 117 MG/DL — HIGH (ref 70–99)
GLUCOSE SERPL-MCNC: 146 MG/DL — HIGH (ref 70–99)
HCO3 BLDA-SCNC: 21 MMOL/L — LOW (ref 23–27)
HCO3 BLDA-SCNC: 22 MMOL/L — LOW (ref 23–27)
HCT VFR BLD CALC: 37.2 % — SIGNIFICANT CHANGE UP (ref 37–47)
HCT VFR BLD CALC: 37.3 % — SIGNIFICANT CHANGE UP (ref 37–47)
HGB BLD-MCNC: 12.5 G/DL — SIGNIFICANT CHANGE UP (ref 12–16)
HGB BLD-MCNC: 12.7 G/DL — SIGNIFICANT CHANGE UP (ref 12–16)
HOROWITZ INDEX BLDA+IHG-RTO: 40 — SIGNIFICANT CHANGE UP
MAGNESIUM SERPL-MCNC: 1.6 MG/DL — LOW (ref 1.8–2.4)
MCHC RBC-ENTMCNC: 29.9 PG — SIGNIFICANT CHANGE UP (ref 27–31)
MCHC RBC-ENTMCNC: 30 PG — SIGNIFICANT CHANGE UP (ref 27–31)
MCHC RBC-ENTMCNC: 33.6 G/DL — SIGNIFICANT CHANGE UP (ref 32–37)
MCHC RBC-ENTMCNC: 34 G/DL — SIGNIFICANT CHANGE UP (ref 32–37)
MCV RBC AUTO: 88.2 FL — SIGNIFICANT CHANGE UP (ref 81–99)
MCV RBC AUTO: 89 FL — SIGNIFICANT CHANGE UP (ref 81–99)
NRBC # BLD: 0 /100 WBCS — SIGNIFICANT CHANGE UP (ref 0–0)
NRBC # BLD: 0 /100 WBCS — SIGNIFICANT CHANGE UP (ref 0–0)
PCO2 BLDA: 26 MMHG — LOW (ref 38–42)
PCO2 BLDA: 34 MMHG — LOW (ref 38–42)
PH BLDA: 7.43 — HIGH (ref 7.38–7.42)
PH BLDA: 7.52 — HIGH (ref 7.38–7.42)
PHOSPHATE SERPL-MCNC: 3 MG/DL — SIGNIFICANT CHANGE UP (ref 2.1–4.9)
PLATELET # BLD AUTO: 223 K/UL — SIGNIFICANT CHANGE UP (ref 130–400)
PLATELET # BLD AUTO: 231 K/UL — SIGNIFICANT CHANGE UP (ref 130–400)
PO2 BLDA: 70 MMHG — LOW (ref 78–95)
PO2 BLDA: 76 MMHG — LOW (ref 78–95)
POTASSIUM SERPL-MCNC: 3.9 MMOL/L — SIGNIFICANT CHANGE UP (ref 3.5–5)
POTASSIUM SERPL-MCNC: 4.1 MMOL/L — SIGNIFICANT CHANGE UP (ref 3.5–5)
POTASSIUM SERPL-SCNC: 3.9 MMOL/L — SIGNIFICANT CHANGE UP (ref 3.5–5)
POTASSIUM SERPL-SCNC: 4.1 MMOL/L — SIGNIFICANT CHANGE UP (ref 3.5–5)
RBC # BLD: 4.18 M/UL — LOW (ref 4.2–5.4)
RBC # BLD: 4.23 M/UL — SIGNIFICANT CHANGE UP (ref 4.2–5.4)
RBC # FLD: 12.9 % — SIGNIFICANT CHANGE UP (ref 11.5–14.5)
RBC # FLD: 13 % — SIGNIFICANT CHANGE UP (ref 11.5–14.5)
SAO2 % BLDA: 96 % — SIGNIFICANT CHANGE UP (ref 94–98)
SAO2 % BLDA: 96 % — SIGNIFICANT CHANGE UP (ref 94–98)
SODIUM SERPL-SCNC: 139 MMOL/L — SIGNIFICANT CHANGE UP (ref 135–146)
SODIUM SERPL-SCNC: 141 MMOL/L — SIGNIFICANT CHANGE UP (ref 135–146)
TROPONIN T SERPL-MCNC: 0.06 NG/ML — CRITICAL HIGH
WBC # BLD: 18.87 K/UL — HIGH (ref 4.8–10.8)
WBC # BLD: 20.6 K/UL — HIGH (ref 4.8–10.8)
WBC # FLD AUTO: 18.87 K/UL — HIGH (ref 4.8–10.8)
WBC # FLD AUTO: 20.6 K/UL — HIGH (ref 4.8–10.8)

## 2018-12-08 RX ORDER — SODIUM CHLORIDE 9 MG/ML
1000 INJECTION INTRAMUSCULAR; INTRAVENOUS; SUBCUTANEOUS ONCE
Qty: 0 | Refills: 0 | Status: COMPLETED | OUTPATIENT
Start: 2018-12-08 | End: 2018-12-08

## 2018-12-08 RX ORDER — SODIUM CHLORIDE 9 MG/ML
1000 INJECTION INTRAMUSCULAR; INTRAVENOUS; SUBCUTANEOUS
Qty: 0 | Refills: 0 | Status: DISCONTINUED | OUTPATIENT
Start: 2018-12-08 | End: 2018-12-09

## 2018-12-08 RX ORDER — PANTOPRAZOLE SODIUM 20 MG/1
40 TABLET, DELAYED RELEASE ORAL
Qty: 0 | Refills: 0 | Status: DISCONTINUED | OUTPATIENT
Start: 2018-12-08 | End: 2018-12-13

## 2018-12-08 RX ORDER — MEROPENEM 1 G/30ML
INJECTION INTRAVENOUS
Qty: 0 | Refills: 0 | Status: DISCONTINUED | OUTPATIENT
Start: 2018-12-08 | End: 2018-12-08

## 2018-12-08 RX ORDER — VANCOMYCIN HCL 1 G
1250 VIAL (EA) INTRAVENOUS EVERY 12 HOURS
Qty: 0 | Refills: 0 | Status: DISCONTINUED | OUTPATIENT
Start: 2018-12-08 | End: 2018-12-08

## 2018-12-08 RX ORDER — HALOPERIDOL DECANOATE 100 MG/ML
2 INJECTION INTRAMUSCULAR
Qty: 0 | Refills: 0 | Status: DISCONTINUED | OUTPATIENT
Start: 2018-12-08 | End: 2018-12-08

## 2018-12-08 RX ORDER — DOCUSATE SODIUM 100 MG
5 CAPSULE ORAL
Qty: 0 | Refills: 0 | Status: DISCONTINUED | OUTPATIENT
Start: 2018-12-08 | End: 2018-12-09

## 2018-12-08 RX ORDER — AMPICILLIN SODIUM AND SULBACTAM SODIUM 250; 125 MG/ML; MG/ML
3 INJECTION, POWDER, FOR SUSPENSION INTRAMUSCULAR; INTRAVENOUS EVERY 6 HOURS
Qty: 0 | Refills: 0 | Status: DISCONTINUED | OUTPATIENT
Start: 2018-12-08 | End: 2018-12-11

## 2018-12-08 RX ORDER — MEROPENEM 1 G/30ML
1000 INJECTION INTRAVENOUS ONCE
Qty: 0 | Refills: 0 | Status: COMPLETED | OUTPATIENT
Start: 2018-12-08 | End: 2018-12-08

## 2018-12-08 RX ORDER — CHLORHEXIDINE GLUCONATE 213 G/1000ML
15 SOLUTION TOPICAL
Qty: 0 | Refills: 0 | Status: DISCONTINUED | OUTPATIENT
Start: 2018-12-08 | End: 2018-12-13

## 2018-12-08 RX ORDER — AMPICILLIN SODIUM AND SULBACTAM SODIUM 250; 125 MG/ML; MG/ML
INJECTION, POWDER, FOR SUSPENSION INTRAMUSCULAR; INTRAVENOUS
Qty: 0 | Refills: 0 | Status: DISCONTINUED | OUTPATIENT
Start: 2018-12-08 | End: 2018-12-11

## 2018-12-08 RX ORDER — LEVETIRACETAM 250 MG/1
1000 TABLET, FILM COATED ORAL EVERY 12 HOURS
Qty: 0 | Refills: 0 | Status: DISCONTINUED | OUTPATIENT
Start: 2018-12-08 | End: 2018-12-14

## 2018-12-08 RX ORDER — MEROPENEM 1 G/30ML
1000 INJECTION INTRAVENOUS EVERY 8 HOURS
Qty: 0 | Refills: 0 | Status: DISCONTINUED | OUTPATIENT
Start: 2018-12-08 | End: 2018-12-08

## 2018-12-08 RX ORDER — ACETAMINOPHEN 500 MG
325 TABLET ORAL ONCE
Qty: 0 | Refills: 0 | Status: COMPLETED | OUTPATIENT
Start: 2018-12-08 | End: 2018-12-08

## 2018-12-08 RX ORDER — HALOPERIDOL DECANOATE 100 MG/ML
2 INJECTION INTRAMUSCULAR
Qty: 0 | Refills: 0 | Status: DISCONTINUED | OUTPATIENT
Start: 2018-12-08 | End: 2018-12-10

## 2018-12-08 RX ORDER — MIDAZOLAM HYDROCHLORIDE 1 MG/ML
0.02 INJECTION, SOLUTION INTRAMUSCULAR; INTRAVENOUS
Qty: 100 | Refills: 0 | Status: DISCONTINUED | OUTPATIENT
Start: 2018-12-08 | End: 2018-12-09

## 2018-12-08 RX ORDER — MAGNESIUM SULFATE 500 MG/ML
2 VIAL (ML) INJECTION ONCE
Qty: 0 | Refills: 0 | Status: COMPLETED | OUTPATIENT
Start: 2018-12-08 | End: 2018-12-08

## 2018-12-08 RX ORDER — ACETAMINOPHEN 500 MG
650 TABLET ORAL ONCE
Qty: 0 | Refills: 0 | Status: COMPLETED | OUTPATIENT
Start: 2018-12-08 | End: 2018-12-08

## 2018-12-08 RX ORDER — AMPICILLIN SODIUM AND SULBACTAM SODIUM 250; 125 MG/ML; MG/ML
3 INJECTION, POWDER, FOR SUSPENSION INTRAMUSCULAR; INTRAVENOUS ONCE
Qty: 0 | Refills: 0 | Status: COMPLETED | OUTPATIENT
Start: 2018-12-08 | End: 2018-12-08

## 2018-12-08 RX ADMIN — Medication 1 DROP(S): at 21:39

## 2018-12-08 RX ADMIN — CHLORHEXIDINE GLUCONATE 15 MILLILITER(S): 213 SOLUTION TOPICAL at 18:12

## 2018-12-08 RX ADMIN — OLANZAPINE 5 MILLIGRAM(S): 15 TABLET, FILM COATED ORAL at 18:12

## 2018-12-08 RX ADMIN — SODIUM CHLORIDE 100 MILLILITER(S): 9 INJECTION INTRAMUSCULAR; INTRAVENOUS; SUBCUTANEOUS at 20:00

## 2018-12-08 RX ADMIN — HALOPERIDOL DECANOATE 2 MILLIGRAM(S): 100 INJECTION INTRAMUSCULAR at 18:48

## 2018-12-08 RX ADMIN — HALOPERIDOL DECANOATE 2 MILLIGRAM(S): 100 INJECTION INTRAMUSCULAR at 12:26

## 2018-12-08 RX ADMIN — AMPICILLIN SODIUM AND SULBACTAM SODIUM 200 GRAM(S): 250; 125 INJECTION, POWDER, FOR SUSPENSION INTRAMUSCULAR; INTRAVENOUS at 12:26

## 2018-12-08 RX ADMIN — SIMVASTATIN 40 MILLIGRAM(S): 20 TABLET, FILM COATED ORAL at 21:39

## 2018-12-08 RX ADMIN — Medication 1 MILLIGRAM(S): at 06:47

## 2018-12-08 RX ADMIN — OLANZAPINE 5 MILLIGRAM(S): 15 TABLET, FILM COATED ORAL at 06:47

## 2018-12-08 RX ADMIN — PANTOPRAZOLE SODIUM 40 MILLIGRAM(S): 20 TABLET, DELAYED RELEASE ORAL at 11:28

## 2018-12-08 RX ADMIN — Medication 325 MILLIGRAM(S): at 18:48

## 2018-12-08 RX ADMIN — Medication 1 MILLIGRAM(S): at 18:13

## 2018-12-08 RX ADMIN — AMPICILLIN SODIUM AND SULBACTAM SODIUM 200 GRAM(S): 250; 125 INJECTION, POWDER, FOR SUSPENSION INTRAMUSCULAR; INTRAVENOUS at 18:48

## 2018-12-08 RX ADMIN — ENOXAPARIN SODIUM 40 MILLIGRAM(S): 100 INJECTION SUBCUTANEOUS at 11:57

## 2018-12-08 RX ADMIN — Medication 650 MILLIGRAM(S): at 16:16

## 2018-12-08 RX ADMIN — SODIUM CHLORIDE 2000 MILLILITER(S): 9 INJECTION INTRAMUSCULAR; INTRAVENOUS; SUBCUTANEOUS at 18:41

## 2018-12-08 RX ADMIN — Medication 50 GRAM(S): at 02:38

## 2018-12-08 RX ADMIN — Medication 5 MILLIGRAM(S): at 18:49

## 2018-12-08 RX ADMIN — MEROPENEM 100 MILLIGRAM(S): 1 INJECTION INTRAVENOUS at 08:45

## 2018-12-08 RX ADMIN — LEVETIRACETAM 400 MILLIGRAM(S): 250 TABLET, FILM COATED ORAL at 18:13

## 2018-12-08 RX ADMIN — LEVETIRACETAM 400 MILLIGRAM(S): 250 TABLET, FILM COATED ORAL at 01:44

## 2018-12-08 RX ADMIN — AMPICILLIN SODIUM AND SULBACTAM SODIUM 200 GRAM(S): 250; 125 INJECTION, POWDER, FOR SUSPENSION INTRAMUSCULAR; INTRAVENOUS at 23:31

## 2018-12-08 RX ADMIN — Medication 325 MILLIGRAM(S): at 18:49

## 2018-12-08 RX ADMIN — Medication 1 DROP(S): at 13:15

## 2018-12-08 RX ADMIN — SODIUM CHLORIDE 2000 MILLILITER(S): 9 INJECTION INTRAMUSCULAR; INTRAVENOUS; SUBCUTANEOUS at 20:00

## 2018-12-08 RX ADMIN — CITALOPRAM 20 MILLIGRAM(S): 10 TABLET, FILM COATED ORAL at 11:57

## 2018-12-08 RX ADMIN — Medication 1000 UNIT(S): at 11:57

## 2018-12-08 RX ADMIN — Medication 650 MILLIGRAM(S): at 16:22

## 2018-12-08 RX ADMIN — CHLORHEXIDINE GLUCONATE 15 MILLILITER(S): 213 SOLUTION TOPICAL at 05:50

## 2018-12-08 NOTE — CHART NOTE - NSCHARTNOTEFT_GEN_A_CORE
Obtained verbal consent for EEG. Spoke with her cousin Ms. Rhianna Rosado via telephone and updated on patient's care. Consent in patient's chart.

## 2018-12-08 NOTE — PROGRESS NOTE ADULT - SUBJECTIVE AND OBJECTIVE BOX
Neurology Progress Note    Interval History:    Patient is seen at bedside. Currently sedated, intubated and VEEG. Patient was febrile with one episode of fever >100.4 and was given Tylenol. No acute events or changes.       Vital Signs Last 24 Hrs  T(C): 37 (08 Dec 2018 20:00), Max: 39 (08 Dec 2018 16:00)  T(F): 98.6 (08 Dec 2018 20:00), Max: 102.2 (08 Dec 2018 16:00)  HR: 80 (08 Dec 2018 21:00) (76 - 120)  BP: 108/53 (08 Dec 2018 21:00) (78/39 - 173/86)  BP(mean): 72 (08 Dec 2018 21:00) (51 - 122)  RR: 20 (08 Dec 2018 21:00) (17 - 39)  SpO2: 98% (08 Dec 2018 21:00) (93% - 100%)  Neurologic Exam:  Orientation: Patient is currently intubated and sedated not following commands.   Cranial Nerves: pupils 3mm brisk response bilaterally eyes are supra ducted at primary position and open ,+gag  Motor: no movements were noted on exam.   MEDICATIONS  (STANDING):  ampicillin/sulbactam  IVPB      ampicillin/sulbactam  IVPB 3 Gram(s) IV Intermittent every 6 hours  artificial  tears Solution 1 Drop(s) Both EYES three times a day  benztropine 1 milliGRAM(s) Oral two times a day  chlorhexidine 0.12% Liquid 15 milliLiter(s) Swish and Spit two times a day  cholecalciferol 1000 Unit(s) Oral daily  citalopram 20 milliGRAM(s) Oral daily  docusate sodium Liquid 5 milliGRAM(s) Oral two times a day  enoxaparin Injectable 40 milliGRAM(s) SubCutaneous daily  haloperidol    Concentrate 2 milliGRAM(s) Enteral Tube two times a day  levETIRAcetam  IVPB 1000 milliGRAM(s) IV Intermittent every 12 hours  midazolam Infusion 0.022 mG/kG/Hr (1.4 mL/Hr) IV Continuous <Continuous>  OLANZapine 5 milliGRAM(s) Oral two times a day  ondansetron Injectable 4 milliGRAM(s) IV Push once  pantoprazole   Suspension 40 milliGRAM(s) Oral before breakfast  simvastatin 40 milliGRAM(s) Oral at bedtime  sodium chloride 0.9%. 1000 milliLiter(s) (100 mL/Hr) IV Continuous <Continuous>  Labs:  CBC Full  -  ( 08 Dec 2018 04:52 )  WBC Count : 18.87 K/uL  Hemoglobin : 12.5 g/dL  Hematocrit : 37.2 %  Platelet Count - Automated : 223 K/uL  Mean Cell Volume : 89.0 fL  Mean Cell Hemoglobin : 29.9 pg  Mean Cell Hemoglobin Concentration : 33.6 g/dL  Auto Neutrophil # : x  Auto Lymphocyte # : x  Auto Monocyte # : x  Auto Eosinophil # : x  Auto Basophil # : x  Auto Neutrophil % : x  Auto Lymphocyte % : x  Auto Monocyte % : x  Auto Eosinophil % : x  Auto Basophil % : x        139  |  101  |  13  ----------------------------<  117<H>  3.9   |  22  |  0.5<L>    Ca    8.6      08 Dec 2018 04:52  Phos  3.0       Mg     1.6         TPro  6.3  /  Alb  3.8  /  TBili  <0.2  /  DBili  x   /  AST  41  /  ALT  28  /  AlkPhos  99      LIVER FUNCTIONS - ( 07 Dec 2018 08:47 )  Alb: 3.8 g/dL / Pro: 6.3 g/dL / ALK PHOS: 99 U/L / ALT: 28 U/L / AST: 41 U/L / GGT: x           PT/INR - ( 07 Dec 2018 08:47 )   PT: 12.80 sec;   INR: 1.11 ratio    PTT - ( 07 Dec 2018 08:47 )  PTT:28.7 sec  Urinalysis Basic - ( 07 Dec 2018 08:47 )    Color: Yellow / Appearance: Clear / S.020 / pH: x  Gluc: x / Ketone: Trace  / Bili: Negative / Urobili: 0.2 mg/dL   Blood: x / Protein: 100 mg/dL / Nitrite: Negative   Leuk Esterase: Negative / RBC: 6-10 /HPF / WBC x   Sq Epi: x / Non Sq Epi: Occasional /HPF / Bacteria: x    Neuro Imaging:   EXAM:  CT BRAIN        PROCEDURE DATE:  2018    INTERPRETATION:  Clinical History / Reason for exam: Seizure-like   activity.    TECHNIQUE: Multiple axial CT images of the head were obtained from the   base of skull to the vertex without the administration of IV contrast.      COMPARISON: CT head 2018 1:52 PM.      FINDINGS:    The ventricular system, basal cisterns, and sulcal pattern are   appropriate for patients age.    There is no acute extra-axial collection.      No mass effect, midline shift, or hemorrhage is seen.      Gray-white differentiation appears grossly preserved.    There is no depressed skull fracture.     The paranasal sinuses and mastoid air cells are well-aerated.      IMPRESSION:    No evidence of acute intracranial pathology. Unchanged exam compared to   CT head from prior day. Neurology Progress Note    Interval History:    Patient is seen at bedside. Currently sedated, intubated and VEEG. Patient was febrile with one episode of fever >100.4 and was given Tylenol. No acute events or changes.       Vital Signs Last 24 Hrs  T(C): 37 (08 Dec 2018 20:00), Max: 39 (08 Dec 2018 16:00)  T(F): 98.6 (08 Dec 2018 20:00), Max: 102.2 (08 Dec 2018 16:00)  HR: 80 (08 Dec 2018 21:00) (76 - 120)  BP: 108/53 (08 Dec 2018 21:00) (78/39 - 173/86)  BP(mean): 72 (08 Dec 2018 21:00) (51 - 122)  RR: 20 (08 Dec 2018 21:00) (17 - 39)  SpO2: 98% (08 Dec 2018 21:00) (93% - 100%)  Neurologic Exam:  Orientation: Patient is currently intubated and sedated not following commands.   Cranial Nerves: pupils 3mm brisk response bilaterally eyes are supra ducted at primary position and open ,+gag  Motor: no movements were noted on exam.   MEDICATIONS  (STANDING):  ampicillin/sulbactam  IVPB      ampicillin/sulbactam  IVPB 3 Gram(s) IV Intermittent every 6 hours  artificial  tears Solution 1 Drop(s) Both EYES three times a day  benztropine 1 milliGRAM(s) Oral two times a day  chlorhexidine 0.12% Liquid 15 milliLiter(s) Swish and Spit two times a day  cholecalciferol 1000 Unit(s) Oral daily  citalopram 20 milliGRAM(s) Oral daily  docusate sodium Liquid 5 milliGRAM(s) Oral two times a day  enoxaparin Injectable 40 milliGRAM(s) SubCutaneous daily  haloperidol    Concentrate 2 milliGRAM(s) Enteral Tube two times a day  levETIRAcetam  IVPB 1000 milliGRAM(s) IV Intermittent every 12 hours  midazolam Infusion 0.022 mG/kG/Hr (1.4 mL/Hr) IV Continuous <Continuous>  OLANZapine 5 milliGRAM(s) Oral two times a day  ondansetron Injectable 4 milliGRAM(s) IV Push once  pantoprazole   Suspension 40 milliGRAM(s) Oral before breakfast  simvastatin 40 milliGRAM(s) Oral at bedtime  sodium chloride 0.9%. 1000 milliLiter(s) (100 mL/Hr) IV Continuous <Continuous>  Labs:  CBC Full  -  ( 08 Dec 2018 04:52 )  WBC Count : 18.87 K/uL  Hemoglobin : 12.5 g/dL  Hematocrit : 37.2 %  Platelet Count - Automated : 223 K/uL  Mean Cell Volume : 89.0 fL  Mean Cell Hemoglobin : 29.9 pg  Mean Cell Hemoglobin Concentration : 33.6 g/dL  Auto Neutrophil # : x  Auto Lymphocyte # : x  Auto Monocyte # : x  Auto Eosinophil # : x  Auto Basophil # : x  Auto Neutrophil % : x  Auto Lymphocyte % : x  Auto Monocyte % : x  Auto Eosinophil % : x  Auto Basophil % : x        139  |  101  |  13  ----------------------------<  117<H>  3.9   |  22  |  0.5<L>    Ca    8.6      08 Dec 2018 04:52  Phos  3.0       Mg     1.6         TPro  6.3  /  Alb  3.8  /  TBili  <0.2  /  DBili  x   /  AST  41  /  ALT  28  /  AlkPhos  99      LIVER FUNCTIONS - ( 07 Dec 2018 08:47 )  Alb: 3.8 g/dL / Pro: 6.3 g/dL / ALK PHOS: 99 U/L / ALT: 28 U/L / AST: 41 U/L / GGT: x           PT/INR - ( 07 Dec 2018 08:47 )   PT: 12.80 sec;   INR: 1.11 ratio    PTT - ( 07 Dec 2018 08:47 )  PTT:28.7 sec  Urinalysis Basic - ( 07 Dec 2018 08:47 )    Color: Yellow / Appearance: Clear / S.020 / pH: x  Gluc: x / Ketone: Trace  / Bili: Negative / Urobili: 0.2 mg/dL   Blood: x / Protein: 100 mg/dL / Nitrite: Negative   Leuk Esterase: Negative / RBC: 6-10 /HPF / WBC x   Sq Epi: x / Non Sq Epi: Occasional /HPF / Bacteria: x    Neuro Imaging:   EXAM:  CT BRAIN        PROCEDURE DATE:  2018    INTERPRETATION:  Clinical History / Reason for exam: Seizure-like   activity.    TECHNIQUE: Multiple axial CT images of the head were obtained from the   base of skull to the vertex without the administration of IV contrast.      COMPARISON: CT head 2018 1:52 PM.      FINDINGS:    The ventricular system, basal cisterns, and sulcal pattern are   appropriate for patients age.    There is no acute extra-axial collection.      No mass effect, midline shift, or hemorrhage is seen.      Gray-white differentiation appears grossly preserved.    There is no depressed skull fracture.     The paranasal sinuses and mastoid air cells are well-aerated.      IMPRESSION:    No evidence of acute intracranial pathology. Unchanged exam compared to   CT head from prior day.    < from: EEG Monitoring Each 24 hours (18 @ 10:40) >  Impression  This is an abnormal Video EEG study with generalized slowing consistent   with diffuse cerebral dysfunction. There is no epileptiform activity   noted in the study.    TERRENCE ARAGON  This document has been electronically signed. Dec  9 2018  1:08PM    < end of copied text >

## 2018-12-08 NOTE — PROGRESS NOTE ADULT - SUBJECTIVE AND OBJECTIVE BOX
SUBJECTIVE:    Patient is a 67y old Female who presents with a chief complaint of choking, acute respiratory failure, intubated (07 Dec 2018 14:30)    Overnight Eevents:  Call to bedside for tonic colonic seizure like activity undetermined amount of time associated with 1 ep on non billious non bloody vomitus.  Will followup Neuro cs, BMP, mag, phos CBC, CE,  ABG, CXR, CTHnoncon, REEG, and ekg.  Patient will be made NPO with low continues wall suction, Increased versed, with aspiration precautions, and frequent neuro checks. seizure precautions.  Plan discussed with senior. Spoke with neurology team will start Keppra IV 1g BID and VEEG in AM.     PAST MEDICAL & SURGICAL HISTORY  Depressed  High cholesterol  Tardive dyskinesia  Schizophrenia  GERD (gastroesophageal reflux disease)  No significant past surgical history    SOCIAL HISTORY:  Negative for smoking/alcohol/drug use.     ALLERGIES:  No Known Allergies    MEDICATIONS:  STANDING MEDICATIONS  benztropine 1 milliGRAM(s) Oral two times a day  chlorhexidine 0.12% Liquid 15 milliLiter(s) Swish and Spit two times a day  cholecalciferol 1000 Unit(s) Oral daily  citalopram 20 milliGRAM(s) Oral daily  docusate sodium 100 milliGRAM(s) Oral two times a day  enoxaparin Injectable 40 milliGRAM(s) SubCutaneous daily  fentaNYL   Infusion. 0.5 MICROgram(s)/kG/Hr IV Continuous <Continuous>  haloperidol    Injectable 2 milliGRAM(s) IV Push two times a day  levETIRAcetam  IVPB 1000 milliGRAM(s) IV Intermittent every 12 hours  midazolam Infusion 0.02 mG/kG/Hr IV Continuous <Continuous>  OLANZapine 5 milliGRAM(s) Oral two times a day  ondansetron Injectable 4 milliGRAM(s) IV Push once  pantoprazole    Tablet 40 milliGRAM(s) Oral before breakfast  simvastatin 40 milliGRAM(s) Oral at bedtime    PRN MEDICATIONS    VITALS:   T(F): 97.6  HR: 85  BP: 106/47  RR: 18  SpO2: 99%    PHYSICAL EXAM:  HEENT: Pupils equal, reactive to light symmetrically.    PULM: Clear to auscultation bilaterally, no significant sputum production.    CVS: Regular rate and rhythm, no murmurs, rubs, or gallops.    ABD: Soft, nondistended, nontender, no masses.    EXT: No edema, nontender. tonic colonic movements    SKIN: Warm and well perfused, no rashes noted.    LABS:                        12.7   20.60 )-----------( 231      ( 07 Dec 2018 22:17 )             37.3         141  |  103  |  13  ----------------------------<  146<H>  4.1   |  21  |  0.6<L>    Ca    8.6      07 Dec 2018 22:17  Phos  3.0       Mg     1.6         TPro  6.3  /  Alb  3.8  /  TBili  <0.2  /  DBili  x   /  AST  41  /  ALT  28  /  AlkPhos  99      PT/INR - ( 07 Dec 2018 08:47 )   PT: 12.80 sec;   INR: 1.11 ratio         PTT - ( 07 Dec 2018 08:47 )  PTT:28.7 sec  Urinalysis Basic - ( 07 Dec 2018 08:47 )    Color: Yellow / Appearance: Clear / S.020 / pH: x  Gluc: x / Ketone: Trace  / Bili: Negative / Urobili: 0.2 mg/dL   Blood: x / Protein: 100 mg/dL / Nitrite: Negative   Leuk Esterase: Negative / RBC: 6-10 /HPF / WBC x   Sq Epi: x / Non Sq Epi: Occasional /HPF / Bacteria: x      ABG - ( 07 Dec 2018 22:54 )  pH, Arterial: 7.40  pH, Blood: x     /  pCO2: 37    /  pO2: 100   / HCO3: 22    / Base Excess: -2.0  /  SaO2: 98                Creatine Kinase, Serum: 578 U/L <H> (18 @ 00:19)  Troponin T, Serum: 0.06 ng/mL <HH> (18 @ 00:19)  Lactate, Blood: 1.7 mmol/L (18 @ 20:47)  Creatine Kinase, Serum: 555 U/L <H> (18 @ 20:47)  Troponin T, Serum: 0.07 ng/mL <HH> (18 @ 20:47)  Troponin T, Serum: <0.01 ng/mL (18 @ 08:47)      CARDIAC MARKERS ( 08 Dec 2018 00:19 )  x     / 0.06 ng/mL / 578 U/L / x     / 11.0 ng/mL  CARDIAC MARKERS ( 07 Dec 2018 20:47 )  x     / 0.07 ng/mL / 555 U/L / x     / 11.7 ng/mL  CARDIAC MARKERS ( 07 Dec 2018 08:47 )  x     / <0.01 ng/mL / x     / x     / x              18 @ 07:01  -  18 @ 01:37  --------------------------------------------------------  IN: 1119.3 mL / OUT: 1330 mL / NET: -210.7 mL      Mode: AC/ CMV (Assist Control/ Continuous Mandatory Ventilation), RR (machine): 18, TV (machine): 450, FiO2: 40, PEEP: 5, ITime: 1, MAP: 8, PIP: 20    Radiology:

## 2018-12-08 NOTE — PROGRESS NOTE ADULT - ASSESSMENT
66 y/o  with PMH of  HTN and schizophrenia brought into the hospital after chocking episode w/ cardiac arrest. No jerking movements were seen on exam.     Plan  - continue VEEG  - continue keppra 1g Q12  - keep Mg > 2.0  - continue sedation for now  - continue supportive care .     Neuro Attending note will follow.

## 2018-12-08 NOTE — CONSULT NOTE ADULT - ATTENDING COMMENTS
66 y/o Female with PMH of  HTN and schizophrenia, lives at [facility] assisted living, brought to the ED by EMS after she choked , unsuccessful Heimlich maneuver followed by collapse and was pulseless .CPR was initiated and after 10min of cpr ROSC noted to have multiple episodes of seizure-like activity with myoclonic jerks currently improved on sedation.  Possible anoxic brain injury.    Plan:  - continue VEEG  - continue keppra 1g Q12  - keep Mg > 2.0  - continue sedation for now  - continue supportive care

## 2018-12-08 NOTE — PROGRESS NOTE ADULT - ASSESSMENT
IMPRESSION:  Acute Hypoxic Respiratory Failure   s/p Cardiac Arrest with ROSC  Aspiration PNA    R/O Seizure activity   R/O Anoxic brain injury   R/O Sepsis       PLAN:    CNS: Spontaneous awakening trial, f/u Neuro recs Keppra  iv bid for Seizure ppx, f/U CTH     HEENT: Oral care, eye care     PULMONARY:  HOB @ 45 degrees.  MV     CARDIOVASCULAR: RRR off pressors, I=O     GI: GI prophylaxis. NPO pending Neuro     RENAL:  Follow up lytes.  Correct as needed    INFECTIOUS DISEASE: Follow up cultures    HEMATOLOGICAL:  DVT prophylaxis.    ENDOCRINE:  Follow up FS.  Insulin protocol if needed.    MUSCULOSKELETAL: Bedrest     WILL DISCUSS WITH NIKO ORONA

## 2018-12-08 NOTE — CONSULT NOTE ADULT - SUBJECTIVE AND OBJECTIVE BOX
CC: S/p cardiac arrest now with multiple generalized Seizure      HPI:  68 y/o Female with PMH listed below including HTN and schizophrenia, lives at [facility], brought to the ED by EMS after cardiac arrest. On day of presentation, she was eating a bagel when she choked and collapsed. Heimlich maneuver was performed and CPR was initiated as she was pulseless. EMS arrived after around 10 mins. ROSC was established after pushing 1 dose of epi. She was intubated on site and brought to ED. Patient was admitted to ICU once hemodynamically stable. Since 6pm she was noted to have multiple brief whole body shaking episodes with eye rolling upwards and  myoclonic like jerks in between episodes which became frequent as time progressed.        Home medications  -meloxicam 15mg q 24  -lorazepam 0.5mg q 8  -citalopram 20mg q 24  -Zocor 40mg q hs  -benztropine 1mg bid  -haldol 1mg bid  -haloperidol deaconate 100mg/ml 75 mg q 4 weeks  -olanzapine 5mg bid  -vitamin d3 1000units q 24    social history  chronic smoker 1ppd for several yrs (as per family)    Neuro Exam:  Orientation: Patient is currently intubated and sedated not following commands.   Cranial Nerves: pupils 3mm brisk response bilaterally eyes are supra ducted at primary position and open ,+gag and corneals, not breathing over the vent  Motor: patient was noted to have frequent myoclonic like jerky movements              No purposeful movements noted at this time             No abnormal posturing   Sensory exam: Responds to pain upper > lower extremities  Plantar responses mute          Allergies    No Known Allergies    Intolerances      MEDICATIONS  (STANDING):  benztropine 1 milliGRAM(s) Oral two times a day  chlorhexidine 0.12% Liquid 15 milliLiter(s) Swish and Spit two times a day  cholecalciferol 1000 Unit(s) Oral daily  citalopram 20 milliGRAM(s) Oral daily  docusate sodium 100 milliGRAM(s) Oral two times a day  enoxaparin Injectable 40 milliGRAM(s) SubCutaneous daily  fentaNYL   Infusion. 0.5 MICROgram(s)/kG/Hr (3.5 mL/Hr) IV Continuous <Continuous>  haloperidol    Injectable 2 milliGRAM(s) IV Push two times a day  levETIRAcetam  IVPB 1000 milliGRAM(s) IV Intermittent every 12 hours  midazolam Infusion 0.02 mG/kG/Hr (1.4 mL/Hr) IV Continuous <Continuous>  OLANZapine 5 milliGRAM(s) Oral two times a day  ondansetron Injectable 4 milliGRAM(s) IV Push once  pantoprazole    Tablet 40 milliGRAM(s) Oral before breakfast  simvastatin 40 milliGRAM(s) Oral at bedtime    MEDICATIONS  (PRN):      LABS:                        12.7   20.60 )-----------( 231      ( 07 Dec 2018 22:17 )             37.3     12-07    141  |  103  |  13  ----------------------------<  146<H>  4.1   |  21  |  0.6<L>    Ca    8.6      07 Dec 2018 22:17  Phos  3.0     12-07  Mg     1.6     12-07    TPro  6.3  /  Alb  3.8  /  TBili  <0.2  /  DBili  x   /  AST  41  /  ALT  28  /  AlkPhos  99  12-07    PT/INR - ( 07 Dec 2018 08:47 )   PT: 12.80 sec;   INR: 1.11 ratio         PTT - ( 07 Dec 2018 08:47 )  PTT:28.7 sec        Neuro Imaging:  < from: CT Head No Cont (12.08.18 @ 01:15) >  FINDINGS:    The ventricular system, basal cisterns, and sulcal pattern are   appropriate for patients age.    There is no acute extra-axial collection.      No mass effect, midline shift, or hemorrhage is seen.      Gray-white differentiation appears grossly preserved.    There is no depressed skull fracture.     The paranasal sinuses and mastoid air cells are well-aerated.      IMPRESSION:    No evidence of acute intracranial pathology. Unchanged exam compared to   CT head of 12/7/2018 1:52 PM      < end of copied text >    EEG:     Echo:   Carotid Doppler: N/A  Telemetry: CC: S/p cardiac arrest now with multiple generalized Seizure    HPI:  66 y/o Female with PMH listed below including HTN and schizophrenia, lives at [facility], brought to the ED by EMS after cardiac arrest. On day of presentation, she was eating a bagel when she choked and collapsed. Heimlich maneuver was performed and CPR was initiated as she was pulseless. EMS arrived after around 10 mins. ROSC was established after pushing 1 dose of epi. She was intubated on site and brought to ED. Patient was admitted to ICU once hemodynamically stable. Since 6pm she was noted to have multiple brief whole body shaking episodes with eye rolling upwards and  myoclonic like jerks in between episodes which became frequent as time progressed.      Home medications  -meloxicam 15mg q 24  -lorazepam 0.5mg q 8  -citalopram 20mg q 24  -Zocor 40mg q hs  -benztropine 1mg bid  -haldol 1mg bid  -haloperidol deaconate 100mg/ml 75 mg q 4 weeks  -olanzapine 5mg bid  -vitamin d3 1000units q 24    social history  chronic smoker 1ppd for several yrs (as per family)    Neuro Exam:  Orientation: Patient is currently intubated and sedated not following commands.   Cranial Nerves: pupils 3mm brisk response bilaterally eyes are supra ducted at primary position and open ,+gag and corneals, not breathing over the vent  Motor: patient was noted to have frequent myoclonic like jerky movements              No purposeful movements noted at this time             No abnormal posturing   Sensory exam: Responds to pain upper > lower extremities  Plantar responses mute    Allergies    No Known Allergies    Intolerances    MEDICATIONS  (STANDING):  benztropine 1 milliGRAM(s) Oral two times a day  chlorhexidine 0.12% Liquid 15 milliLiter(s) Swish and Spit two times a day  cholecalciferol 1000 Unit(s) Oral daily  citalopram 20 milliGRAM(s) Oral daily  docusate sodium 100 milliGRAM(s) Oral two times a day  enoxaparin Injectable 40 milliGRAM(s) SubCutaneous daily  fentaNYL   Infusion. 0.5 MICROgram(s)/kG/Hr (3.5 mL/Hr) IV Continuous <Continuous>  haloperidol    Injectable 2 milliGRAM(s) IV Push two times a day  levETIRAcetam  IVPB 1000 milliGRAM(s) IV Intermittent every 12 hours  midazolam Infusion 0.02 mG/kG/Hr (1.4 mL/Hr) IV Continuous <Continuous>  OLANZapine 5 milliGRAM(s) Oral two times a day  ondansetron Injectable 4 milliGRAM(s) IV Push once  pantoprazole    Tablet 40 milliGRAM(s) Oral before breakfast  simvastatin 40 milliGRAM(s) Oral at bedtime    MEDICATIONS  (PRN):      LABS:                        12.7   20.60 )-----------( 231      ( 07 Dec 2018 22:17 )             37.3     12-07    141  |  103  |  13  ----------------------------<  146<H>  4.1   |  21  |  0.6<L>    Ca    8.6      07 Dec 2018 22:17  Phos  3.0     12-07  Mg     1.6     12-07    TPro  6.3  /  Alb  3.8  /  TBili  <0.2  /  DBili  x   /  AST  41  /  ALT  28  /  AlkPhos  99  12-07    PT/INR - ( 07 Dec 2018 08:47 )   PT: 12.80 sec;   INR: 1.11 ratio         PTT - ( 07 Dec 2018 08:47 )  PTT:28.7 sec        Neuro Imaging:  < from: CT Head No Cont (12.08.18 @ 01:15) >  FINDINGS:    The ventricular system, basal cisterns, and sulcal pattern are   appropriate for patients age.    There is no acute extra-axial collection.      No mass effect, midline shift, or hemorrhage is seen.      Gray-white differentiation appears grossly preserved.    There is no depressed skull fracture.     The paranasal sinuses and mastoid air cells are well-aerated.      IMPRESSION:    No evidence of acute intracranial pathology. Unchanged exam compared to   CT head of 12/7/2018 1:52 PM      < end of copied text >    EEG:     Echo:   Carotid Doppler: N/A  Telemetry:

## 2018-12-08 NOTE — PROGRESS NOTE ADULT - SUBJECTIVE AND OBJECTIVE BOX
OVERNIGHT EVENTS: still intubated, ventilated, twitching movement, only versed ns 75 cc/h s/p neuro eval    Vital Signs Last 24 Hrs  T(C): 37.9 (08 Dec 2018 05:00), Max: 37.9 (08 Dec 2018 05:00)  T(F): 100.3 (08 Dec 2018 05:00), Max: 100.3 (08 Dec 2018 05:00)  HR: 88 (08 Dec 2018 08:30) (62 - 125)  BP: 102/48 (08 Dec 2018 08:30) (88/60 - 173/86)  BP(mean): 68 (08 Dec 2018 08:30) (63 - 122)  RR: 22 (08 Dec 2018 08:30) (17 - 39)  SpO2: 96% (08 Dec 2018 08:30) (93% - 100%)    PHYSICAL EXAMINATION:    GENERAL: sedated, s/p keppra    HEENT: Head is normocephalic and atraumatic. Extraocular muscles are intact. Mucous membranes are moist.    NECK: Supple.    LUNGS: dec bs l side    HEART: Regular rate and rhythm without murmur.    ABDOMEN: Soft, nontender, and nondistended.      EXTREMITIES: Without any cyanosis, clubbing, rash, lesions or edema.    NEUROLOGIC: brainstem activity present/    SKIN: No ulceration or induration present.      LABS:                        12.5   18.87 )-----------( 223      ( 08 Dec 2018 04:52 )             37.2     12-    139  |  101  |  13  ----------------------------<  117<H>  3.9   |  22  |  0.5<L>    Ca    8.6      08 Dec 2018 04:52  Phos  3.0     12-  Mg     1.6     12    TPro  6.3  /  Alb  3.8  /  TBili  <0.2  /  DBili  x   /  AST  41  /  ALT  28  /  AlkPhos  99  12-    PT/INR - ( 07 Dec 2018 08:47 )   PT: 12.80 sec;   INR: 1.11 ratio         PTT - ( 07 Dec 2018 08:47 )  PTT:28.7 sec  Urinalysis Basic - ( 07 Dec 2018 08:47 )    Color: Yellow / Appearance: Clear / S.020 / pH: x  Gluc: x / Ketone: Trace  / Bili: Negative / Urobili: 0.2 mg/dL   Blood: x / Protein: 100 mg/dL / Nitrite: Negative   Leuk Esterase: Negative / RBC: 6-10 /HPF / WBC x   Sq Epi: x / Non Sq Epi: Occasional /HPF / Bacteria: x      ABG - ( 07 Dec 2018 22:54 )  pH, Arterial: 7.43  pH, Blood: x     /  pCO2: 34   /  pO2: 70   / HCO3: 22    / Base Excess: -2.0  /  SaO2: 98        450/18/40/5        CARDIAC MARKERS ( 08 Dec 2018 00:19 )  x     / 0.06 ng/mL / 578 U/L / x     / 11.0 ng/mL  CARDIAC MARKERS ( 07 Dec 2018 20:47 )  x     / 0.07 ng/mL / 555 U/L / x     / 11.7 ng/mL  CARDIAC MARKERS ( 07 Dec 2018 08:47 )  x     / <0.01 ng/mL / x     / x     / x              Lactate, Blood: 1.7 mmol/L (18 @ 20:47)          18 @ 07:  -  18 @ 07:00  --------------------------------------------------------  IN: 1301.4 mL / OUT: 2245 mL / NET: -943.6 mL    18 @ 07:01  -  18 @ 08:46  --------------------------------------------------------  IN: 150 mL / OUT: 25 mL / NET: 125 mL        MICROBIOLOGY:      MEDICATIONS  (STANDING):  artificial  tears Solution 1 Drop(s) Both EYES three times a day  benztropine 1 milliGRAM(s) Oral two times a day  chlorhexidine 0.12% Liquid 15 milliLiter(s) Swish and Spit two times a day  cholecalciferol 1000 Unit(s) Oral daily  citalopram 20 milliGRAM(s) Oral daily  docusate sodium Liquid 5 milliGRAM(s) Oral two times a day  enoxaparin Injectable 40 milliGRAM(s) SubCutaneous daily  fentaNYL   Infusion. 0.5 MICROgram(s)/kG/Hr (3.5 mL/Hr) IV Continuous <Continuous>  haloperidol    Injectable 2 milliGRAM(s) IV Push two times a day  levETIRAcetam  IVPB 1000 milliGRAM(s) IV Intermittent every 12 hours  meropenem  IVPB 1000 milliGRAM(s) IV Intermittent once  meropenem  IVPB      meropenem  IVPB 1000 milliGRAM(s) IV Intermittent every 8 hours  midazolam Infusion 0.02 mG/kG/Hr (1.4 mL/Hr) IV Continuous <Continuous>  OLANZapine 5 milliGRAM(s) Oral two times a day  ondansetron Injectable 4 milliGRAM(s) IV Push once  pantoprazole   Suspension 40 milliGRAM(s) Oral before breakfast  simvastatin 40 milliGRAM(s) Oral at bedtime  sodium chloride 0.9%. 1000 milliLiter(s) (75 mL/Hr) IV Continuous <Continuous>  vancomycin  IVPB 1250 milliGRAM(s) IV Intermittent every 12 hours    MEDICATIONS  (PRN):      RADIOLOGY & ADDITIONAL STUDIES:

## 2018-12-08 NOTE — CONSULT NOTE ADULT - ASSESSMENT
66 y/o Female with PMH of  HTN and schizophrenia, lives at [facility] assisted living, brought to the ED by EMS after she choked , unsuccessful Heimlich maneuver followed by collapse and was pulseless .CPR was initiated and after 10min of cpr ROSC was achieved and pt was intubated on field. Now being taken care of in ICU and witnessed to have multiple brief GTC seizures with frequent myoclonic like jerks in between episodes .Discussed with neuro attending On call Dr Yessica Galicia.                                           Seizure likely secondary to anoxic brain injury    Plan  Keppra 1 gm bid , give first dose stat  obtain stat mg levels , if <  2 then replete  seizure precautions  VEEG in am  Continue versed drip  spoke to ICU resident  Neuro attending note will follow

## 2018-12-08 NOTE — PROGRESS NOTE ADULT - ASSESSMENT
IMPRESSION:    Acute hypoxic respiratory failure  Cardiac arrest/ seizure/ anoxic brain injury  Aspiration      PLAN:    CNS: V EEG, NEURO F/UP, KEPPRA, KEEP VERSED TILL EEG    HEENT: Oral care    PULMONARY:  HOB @ 45 degrees.  Vent changes as follows: no changes    CARDIOVASCULAR: KEEP IVF, ECHO    GI: GI prophylaxis.  NPO    RENAL:  Follow up lytes.  Correct as needed    INFECTIOUS DISEASE: Follow up cultures, UNASYN    HEMATOLOGICAL:  DVT prophylaxis.    ENDOCRINE:  Follow up FS.  Insulin protocol if needed.    MUSCULOSKELETAL: reposition in bed as per schedule     Monitor in ICU  ALL OVER POOR PROGNOSIS

## 2018-12-09 LAB
ANION GAP SERPL CALC-SCNC: 14 MMOL/L — SIGNIFICANT CHANGE UP (ref 7–14)
APPEARANCE UR: ABNORMAL
BASE EXCESS BLDA CALC-SCNC: -1.8 MMOL/L — SIGNIFICANT CHANGE UP (ref -2–2)
BASE EXCESS BLDA CALC-SCNC: -2.2 MMOL/L — LOW (ref -2–2)
BILIRUB UR-MCNC: NEGATIVE — SIGNIFICANT CHANGE UP
BUN SERPL-MCNC: 14 MG/DL — SIGNIFICANT CHANGE UP (ref 10–20)
CALCIUM SERPL-MCNC: 7.9 MG/DL — LOW (ref 8.5–10.1)
CHLORIDE SERPL-SCNC: 108 MMOL/L — SIGNIFICANT CHANGE UP (ref 98–110)
CO2 SERPL-SCNC: 22 MMOL/L — SIGNIFICANT CHANGE UP (ref 17–32)
COLOR SPEC: SIGNIFICANT CHANGE UP
CREAT SERPL-MCNC: 0.5 MG/DL — LOW (ref 0.7–1.5)
DIFF PNL FLD: ABNORMAL
GLUCOSE BLDC GLUCOMTR-MCNC: 104 MG/DL — HIGH (ref 70–99)
GLUCOSE BLDC GLUCOMTR-MCNC: 114 MG/DL — HIGH (ref 70–99)
GLUCOSE BLDC GLUCOMTR-MCNC: 90 MG/DL — SIGNIFICANT CHANGE UP (ref 70–99)
GLUCOSE SERPL-MCNC: 96 MG/DL — SIGNIFICANT CHANGE UP (ref 70–99)
GLUCOSE UR QL: NEGATIVE — SIGNIFICANT CHANGE UP
HCO3 BLDA-SCNC: 22 MMOL/L — LOW (ref 23–27)
HCO3 BLDA-SCNC: 22 MMOL/L — SIGNIFICANT CHANGE UP (ref 21–29)
HCT VFR BLD CALC: 34 % — LOW (ref 37–47)
HGB BLD-MCNC: 11.5 G/DL — LOW (ref 12–16)
HOROWITZ INDEX BLDA+IHG-RTO: 40 — SIGNIFICANT CHANGE UP
HOROWITZ INDEX BLDA+IHG-RTO: 40 — SIGNIFICANT CHANGE UP
KETONES UR-MCNC: ABNORMAL
LEUKOCYTE ESTERASE UR-ACNC: ABNORMAL
MAGNESIUM SERPL-MCNC: 2.9 MG/DL — HIGH (ref 1.8–2.4)
MCHC RBC-ENTMCNC: 30.8 PG — SIGNIFICANT CHANGE UP (ref 27–31)
MCHC RBC-ENTMCNC: 33.8 G/DL — SIGNIFICANT CHANGE UP (ref 32–37)
MCV RBC AUTO: 91.2 FL — SIGNIFICANT CHANGE UP (ref 81–99)
NITRITE UR-MCNC: NEGATIVE — SIGNIFICANT CHANGE UP
NRBC # BLD: 0 /100 WBCS — SIGNIFICANT CHANGE UP (ref 0–0)
PCO2 BLDA: 32 MMHG — LOW (ref 38–42)
PCO2 BLDA: 34 MMHG — LOW (ref 38–42)
PH BLDA: 7.41 — SIGNIFICANT CHANGE UP (ref 7.38–7.42)
PH BLDA: 7.44 — HIGH (ref 7.38–7.42)
PH UR: 6 — SIGNIFICANT CHANGE UP (ref 5–8)
PLATELET # BLD AUTO: 190 K/UL — SIGNIFICANT CHANGE UP (ref 130–400)
PO2 BLDA: 86 MMHG — SIGNIFICANT CHANGE UP (ref 78–95)
PO2 BLDA: 90 MMHG — SIGNIFICANT CHANGE UP (ref 78–95)
POTASSIUM SERPL-MCNC: 3.9 MMOL/L — SIGNIFICANT CHANGE UP (ref 3.5–5)
POTASSIUM SERPL-SCNC: 3.9 MMOL/L — SIGNIFICANT CHANGE UP (ref 3.5–5)
PROT UR-MCNC: 30
RBC # BLD: 3.73 M/UL — LOW (ref 4.2–5.4)
RBC # FLD: 13.7 % — SIGNIFICANT CHANGE UP (ref 11.5–14.5)
RBC CASTS # UR COMP ASSIST: >50 /HPF
SAO2 % BLDA: 97 % — HIGH (ref 92–96)
SAO2 % BLDA: 97 % — SIGNIFICANT CHANGE UP (ref 94–98)
SODIUM SERPL-SCNC: 144 MMOL/L — SIGNIFICANT CHANGE UP (ref 135–146)
SP GR SPEC: >=1.03 — SIGNIFICANT CHANGE UP (ref 1.01–1.03)
URATE CRY FLD QL MICRO: ABNORMAL /HPF
UROBILINOGEN FLD QL: 1 (ref 0.2–0.2)
WBC # BLD: 14.23 K/UL — HIGH (ref 4.8–10.8)
WBC # FLD AUTO: 14.23 K/UL — HIGH (ref 4.8–10.8)
WBC UR QL: ABNORMAL /HPF

## 2018-12-09 RX ORDER — CHLORHEXIDINE GLUCONATE 213 G/1000ML
1 SOLUTION TOPICAL
Qty: 0 | Refills: 0 | Status: DISCONTINUED | OUTPATIENT
Start: 2018-12-09 | End: 2018-12-13

## 2018-12-09 RX ORDER — MAGNESIUM SULFATE 500 MG/ML
2 VIAL (ML) INJECTION EVERY 4 HOURS
Qty: 0 | Refills: 0 | Status: DISCONTINUED | OUTPATIENT
Start: 2018-12-09 | End: 2018-12-09

## 2018-12-09 RX ORDER — MAGNESIUM SULFATE 500 MG/ML
2 VIAL (ML) INJECTION
Qty: 0 | Refills: 0 | Status: COMPLETED | OUTPATIENT
Start: 2018-12-09 | End: 2018-12-09

## 2018-12-09 RX ORDER — DOCUSATE SODIUM 100 MG
100 CAPSULE ORAL EVERY 8 HOURS
Qty: 0 | Refills: 0 | Status: DISCONTINUED | OUTPATIENT
Start: 2018-12-09 | End: 2018-12-13

## 2018-12-09 RX ORDER — MIDAZOLAM HYDROCHLORIDE 1 MG/ML
0.02 INJECTION, SOLUTION INTRAMUSCULAR; INTRAVENOUS
Qty: 100 | Refills: 0 | Status: DISCONTINUED | OUTPATIENT
Start: 2018-12-09 | End: 2018-12-10

## 2018-12-09 RX ADMIN — CITALOPRAM 20 MILLIGRAM(S): 10 TABLET, FILM COATED ORAL at 11:56

## 2018-12-09 RX ADMIN — Medication 1 MILLIGRAM(S): at 05:25

## 2018-12-09 RX ADMIN — LEVETIRACETAM 400 MILLIGRAM(S): 250 TABLET, FILM COATED ORAL at 05:25

## 2018-12-09 RX ADMIN — SIMVASTATIN 40 MILLIGRAM(S): 20 TABLET, FILM COATED ORAL at 21:53

## 2018-12-09 RX ADMIN — Medication 100 MILLIGRAM(S): at 21:53

## 2018-12-09 RX ADMIN — Medication 1 DROP(S): at 21:53

## 2018-12-09 RX ADMIN — Medication 1 DROP(S): at 05:23

## 2018-12-09 RX ADMIN — CHLORHEXIDINE GLUCONATE 1 APPLICATION(S): 213 SOLUTION TOPICAL at 11:18

## 2018-12-09 RX ADMIN — HALOPERIDOL DECANOATE 2 MILLIGRAM(S): 100 INJECTION INTRAMUSCULAR at 05:25

## 2018-12-09 RX ADMIN — Medication 1 DROP(S): at 13:54

## 2018-12-09 RX ADMIN — AMPICILLIN SODIUM AND SULBACTAM SODIUM 200 GRAM(S): 250; 125 INJECTION, POWDER, FOR SUSPENSION INTRAMUSCULAR; INTRAVENOUS at 11:20

## 2018-12-09 RX ADMIN — OLANZAPINE 5 MILLIGRAM(S): 15 TABLET, FILM COATED ORAL at 05:23

## 2018-12-09 RX ADMIN — Medication 5 MILLIGRAM(S): at 05:24

## 2018-12-09 RX ADMIN — PANTOPRAZOLE SODIUM 40 MILLIGRAM(S): 20 TABLET, DELAYED RELEASE ORAL at 06:03

## 2018-12-09 RX ADMIN — ONDANSETRON 4 MILLIGRAM(S): 8 TABLET, FILM COATED ORAL at 05:23

## 2018-12-09 RX ADMIN — CHLORHEXIDINE GLUCONATE 15 MILLILITER(S): 213 SOLUTION TOPICAL at 05:23

## 2018-12-09 RX ADMIN — Medication 16.67 GRAM(S): at 09:10

## 2018-12-09 RX ADMIN — AMPICILLIN SODIUM AND SULBACTAM SODIUM 200 GRAM(S): 250; 125 INJECTION, POWDER, FOR SUSPENSION INTRAMUSCULAR; INTRAVENOUS at 18:23

## 2018-12-09 RX ADMIN — MIDAZOLAM HYDROCHLORIDE 1.4 MG/KG/HR: 1 INJECTION, SOLUTION INTRAMUSCULAR; INTRAVENOUS at 06:57

## 2018-12-09 RX ADMIN — Medication 1000 UNIT(S): at 11:57

## 2018-12-09 RX ADMIN — LEVETIRACETAM 400 MILLIGRAM(S): 250 TABLET, FILM COATED ORAL at 18:26

## 2018-12-09 RX ADMIN — ENOXAPARIN SODIUM 40 MILLIGRAM(S): 100 INJECTION SUBCUTANEOUS at 11:56

## 2018-12-09 RX ADMIN — HALOPERIDOL DECANOATE 2 MILLIGRAM(S): 100 INJECTION INTRAMUSCULAR at 18:24

## 2018-12-09 RX ADMIN — Medication 1 MILLIGRAM(S): at 18:24

## 2018-12-09 RX ADMIN — Medication 16.67 GRAM(S): at 11:34

## 2018-12-09 RX ADMIN — CHLORHEXIDINE GLUCONATE 15 MILLILITER(S): 213 SOLUTION TOPICAL at 18:24

## 2018-12-09 RX ADMIN — Medication 100 MILLIGRAM(S): at 13:54

## 2018-12-09 RX ADMIN — AMPICILLIN SODIUM AND SULBACTAM SODIUM 200 GRAM(S): 250; 125 INJECTION, POWDER, FOR SUSPENSION INTRAMUSCULAR; INTRAVENOUS at 05:25

## 2018-12-09 RX ADMIN — OLANZAPINE 5 MILLIGRAM(S): 15 TABLET, FILM COATED ORAL at 18:26

## 2018-12-09 NOTE — PROGRESS NOTE ADULT - ASSESSMENT
IMPRESSION:    Acute hypoxic respiratory failure  Cardiac arrest/ seizure/ anoxic brain injury  Aspiration PNA      PLAN:    CNS: V EEG, NEURO F/UP, KEPPRA, KEEP VERSED TILL EEG;     HEENT: Oral care    PULMONARY:  HOB @ 45 degrees.  Vent changes as follows: Decrease ;     CARDIOVASCULAR: KEEP IVF until tolerating diet, TTE    GI: GI prophylaxis.  Start feeds     RENAL:  Follow up lytes.  Correct as needed    INFECTIOUS DISEASE: Follow up cultures, Unasyn     HEMATOLOGICAL:  DVT prophylaxis.    ENDOCRINE:  Follow up FS.  Insulin protocol if needed.    MUSCULOSKELETAL: reposition in bed as per schedule     MICU monitoring for now IMPRESSION:    Acute hypoxic respiratory failure  Cardiac arrest/ seizure/ anoxic brain injury  Aspiration PNA      PLAN:    CNS: V EEG, NEURO F/UP, KEPPRA, KEEP VERSED TILL EEG; REPEAT HEAD CR    HEENT: Oral care    PULMONARY:  HOB @ 45 degrees.  Vent changes as follows: Decrease ;     CARDIOVASCULAR: KEEP IVF until tolerating diet, TTE    GI: GI prophylaxis.  Start feeds     RENAL:  Follow up lytes.  Correct as needed    INFECTIOUS DISEASE: Follow up cultures, Unasyn     HEMATOLOGICAL:  DVT prophylaxis.    ENDOCRINE:  Follow up FS.  Insulin protocol if needed.    MUSCULOSKELETAL: reposition in bed as per schedule     MICU monitoring for now  VERY POOR PROGNOSIS

## 2018-12-09 NOTE — PROGRESS NOTE ADULT - SUBJECTIVE AND OBJECTIVE BOX
CHIEF COMPLAINT:    Patient is a 67y old Female who presents with a chief complaint of choking, acute respiratory failure, intubated (09 Dec 2018 08:54)    Currently admitted to medicine with the primary diagnosis of Cardiac arrest     Today is hospital day 2d. This morning she is resting comfortably in bed and still sedated and on Vent. She withdraws the upper extremities from painful stimuli, pupils are reactive to light.    PAST MEDICAL & SURGICAL HISTORY  Depressed  High cholesterol  Tardive dyskinesia  Schizophrenia  GERD (gastroesophageal reflux disease)  No significant past surgical history    SOCIAL HISTORY:  Negative for smoking/alcohol/drug use.     ALLERGIES:  No Known Allergies    MEDICATIONS:  STANDING MEDICATIONS  ampicillin/sulbactam  IVPB      ampicillin/sulbactam  IVPB 3 Gram(s) IV Intermittent every 6 hours  artificial  tears Solution 1 Drop(s) Both EYES three times a day  benztropine 1 milliGRAM(s) Oral two times a day  chlorhexidine 0.12% Liquid 15 milliLiter(s) Swish and Spit two times a day  chlorhexidine 4% Liquid 1 Application(s) Topical <User Schedule>  cholecalciferol 1000 Unit(s) Oral daily  citalopram 20 milliGRAM(s) Oral daily  docusate sodium Liquid 100 milliGRAM(s) Oral every 8 hours  enoxaparin Injectable 40 milliGRAM(s) SubCutaneous daily  haloperidol    Concentrate 2 milliGRAM(s) Enteral Tube two times a day  levETIRAcetam  IVPB 1000 milliGRAM(s) IV Intermittent every 12 hours  midazolam Infusion 0.022 mG/kG/Hr IV Continuous <Continuous>  OLANZapine 5 milliGRAM(s) Oral two times a day  pantoprazole   Suspension 40 milliGRAM(s) Oral before breakfast  simvastatin 40 milliGRAM(s) Oral at bedtime  sodium chloride 0.9%. 1000 milliLiter(s) IV Continuous <Continuous>    PRN MEDICATIONS    VITALS:   T(F): 100.3  HR: 76  BP: 119/53  RR: 19  SpO2: 99%    LABS:                        11.5   14.23 )-----------( 190      ( 09 Dec 2018 04:15 )             34.0     12-09    144  |  108  |  14  ----------------------------<  96  3.9   |  22  |  0.5<L>    Ca    7.9<L>      09 Dec 2018 04:15  Phos  3.0     12-07  Mg     1.6     12-07        Urinalysis Basic - ( 08 Dec 2018 23:55 )    Color: Dark Yellow / Appearance: Cloudy / SG: >=1.030 / pH: x  Gluc: x / Ketone: Trace  / Bili: Negative / Urobili: 1.0   Blood: x / Protein: 30 / Nitrite: Negative   Leuk Esterase: Small / RBC: >50 /HPF / WBC 6-10 /HPF   Sq Epi: x / Non Sq Epi: x / Bacteria: x      ABG - ( 09 Dec 2018 07:46 )  pH, Arterial: 7.41  pH, Blood: x     /  pCO2: 34    /  pO2: 90    / HCO3: 22    / Base Excess: -2.2  /  SaO2: 97                    CARDIAC MARKERS ( 08 Dec 2018 00:19 )  x     / 0.06 ng/mL / 578 U/L / x     / 11.0 ng/mL  CARDIAC MARKERS ( 07 Dec 2018 20:47 )  x     / 0.07 ng/mL / 555 U/L / x     / 11.7 ng/mL        RADIOLOGY:    PHYSICAL EXAM:  GEN: Mild distress  PULM: Mildly decreased breathe sounds on the left lower lung fileds  CARD: S1/S2 present. RRR.   GI: Soft, non-tender, non-distended. Bowel sounds present  MSK: NC/NC/NE/2+PP/no edema  NEURO: Unable to fully assess as pt is under sedation. Withdraws both UEs from painful stimuli

## 2018-12-09 NOTE — PHARMACOTHERAPY INTERVENTION NOTE - COMMENTS
d/w medical team, per neuro maintain Mg 2, level this am 1.6, d/w medical team, recommended 2g IV q3h x2 doses

## 2018-12-09 NOTE — PROGRESS NOTE ADULT - ASSESSMENT
68 y/o F, with PMH listed below including HTN and schizophrenia, lives at [facility], brought to the ED by EMS after cardiac arrest. On day of presentation, she was eating a bagel when she choked and collapsed. Heimlich maneuver was performed and CPR was initiated as she was pulseless. EMS arrived after around 10 mins. ROSC was established after pushing 1 dose of epi. She was intubated on site and brought to ED.     # Acute hypoxemic respiratory failure  cardiac arrest secondary to foreign body airway obstruction after choking while eating bagel  bagel seen in pharynx during intubation as per EMS    on 40%, peep-5, 450 tv, rr 16  sedated with fentanyl and versed  cxr- et tube in place  CTH negative    if spikes a fever or wbc trending up, consider augmentin for asp pna    # Anoxic Brain Injury:     Neuro following  CTH negative    # Schizophrenia    c/w haloperidol and olanzapine    # Depression  c/w citalopram    # GERD:    -c/w ppi    # Chronic tardive dyskinesia   - due to antidopaminergic meds  lip smacking tongue movements chronic as per family  unlikely due to seizures    dvt ppx- lovenox 40 q24    ng tube feed 66 y/o F, with PMH listed below including HTN and schizophrenia, lives at [facility], brought to the ED by EMS after cardiac arrest. On day of presentation, she was eating a bagel when she choked and collapsed. Heimlich maneuver was performed and CPR was initiated as she was pulseless. EMS arrived after around 10 mins. ROSC was established after pushing 1 dose of epi. She was intubated on site and brought to ED.     # Acute hypoxemic respiratory failure: on vent    cardiac arrest secondary to foreign body airway obstruction after choking while eating bagel  bagel seen in pharynx during intubation as per EMS  On mechanical ventilation  sedated with Versed  CTH negative    # Aspiration PNA: improving    Continue Augmentin  Tmax 100.3F   WBC trending down    # Anoxic Brain Injury:     EEG: generalized slowing consistent with diffuse cerebral dysfunction  Neuro f/u: - continue keppra 1g Q12, keep Mg > 2.0, continue sedation for now, continue supportive care.  CTH negative  Repeat CT head pending    # Schizophrenia    c/w haloperidol and olanzapine    # Depression    c/w citalopram    # GERD:    c/w ppi    # Chronic tardive dyskinesia     due to antidopaminergic meds  lip smacking tongue movements chronic as per family  unlikely due to seizures    dvt ppx- lovenox 40 q24    ng tube feed

## 2018-12-09 NOTE — PROGRESS NOTE ADULT - SUBJECTIVE AND OBJECTIVE BOX
Patient is a 67y old  Female who presents with a chief complaint of choking, acute respiratory failure, intubated (08 Dec 2018 21:25)      OVER NIGHT EVENTS:  On VEEG; No overnight events; Febrile to 102.2;  Versed;     PHYSICAL EXAM    ICU Vital Signs Last 24 Hrs  T(C): 37.4 (09 Dec 2018 08:00), Max: 39 (08 Dec 2018 16:00)  T(F): 99.4 (09 Dec 2018 08:00), Max: 102.2 (08 Dec 2018 16:00)  HR: 75 (09 Dec 2018 08:49) (69 - 98)  BP: 132/53 (09 Dec 2018 08:00) (78/39 - 140/57)  BP(mean): 82 (09 Dec 2018 08:00) (51 - 87)  RR: 25 (09 Dec 2018 08:00) (18 - 28)  SpO2: 100% (09 Dec 2018 08:49) (94% - 100%)    I&O's Detail    08 Dec 2018 07:01  -  09 Dec 2018 07:00  --------------------------------------------------------  IN:    IV PiggyBack: 1500 mL    midazolam Infusion: 1.9 mL    midazolam Infusion: 20.9 mL    midazolam Infusion: 20.9 mL    Oral Fluid: 110 mL    Sodium Chloride 0.9% IV Bolus: 1000 mL    sodium chloride 0.9%.: 2100 mL  Total IN: 4753.7 mL    OUT:    Indwelling Catheter - Urethral: 1040 mL    Nasoenteral Tube: 500 mL  Total OUT: 1540 mL  Total NET: 3213.7 mL    09 Dec 2018 07:01  -  09 Dec 2018 08:55  --------------------------------------------------------  IN:    midazolam Infusion: 3.8 mL    sodium chloride 0.9%.: 200 mL  Total IN: 203.8 mL    OUT:    Indwelling Catheter - Urethral: 75 mL  Total OUT: 75 mL    Total NET: 128.8 mL    General: Comfortable in bed; Sedated   HEENT:  ET +  Lymph node: No palpable LN             Lungs:   Cardiovascular: RRR, S1S2  Abdomen: BS+ve; soft non tender  Extremities: No LE edema  Skin:  No evident Rash  Neurological:  Withdraws to pain UE      LABS:                          11.5   14.23 )-----------( 190      ( 09 Dec 2018 04:15 )             34.0   12-09    144  |  108  |  14  ----------------------------<  96  3.9   |  22  |  0.5<L>    Ca    7.9<L>      09 Dec 2018 04:15  Phos  3.0     12-07  Mg     1.6     12-07    Urinalysis Basic - ( 08 Dec 2018 23:55 )    Color: Dark Yellow / Appearance: Cloudy / SG: >=1.030 / pH: x  Gluc: x / Ketone: Trace  / Bili: Negative / Urobili: 1.0   Blood: x / Protein: 30 / Nitrite: Negative   Leuk Esterase: Small / RBC: >50 /HPF / WBC 6-10 /HPF   Sq Epi: x / Non Sq Epi: x / Bacteria: x    CARDIAC MARKERS ( 08 Dec 2018 00:19 )  x     / 0.06 ng/mL / 578 U/L / x     / 11.0 ng/mL  CARDIAC MARKERS ( 07 Dec 2018 20:47 )  x     / 0.07 ng/mL / 555 U/L / x     / 11.7 ng/mL    Lactate (12-07-18 @ 20:47): 1.7  Lactate (12-07-18 @ 11:52): 3.5<H>  Lactate (12-07-18 @ 08:54): 8.7<H>      Mode: AC/ CMV (Assist Control/ Continuous Mandatory Ventilation)  RR (machine): 18  TV (machine): 450  FiO2: 40  PEEP: 5  ITime: 1  MAP: 9  PIP: 19    ABG - ( 09 Dec 2018 07:46 )  pH, Arterial: 7.41  pH, Blood: x     /  pCO2: 34    /  pO2: 90    / HCO3: 22    / Base Excess: -2.2  /  SaO2: 97        MEDICATIONS  (STANDING):  ampicillin/sulbactam  IVPB      ampicillin/sulbactam  IVPB 3 Gram(s) IV Intermittent every 6 hours  artificial  tears Solution 1 Drop(s) Both EYES three times a day  benztropine 1 milliGRAM(s) Oral two times a day  chlorhexidine 0.12% Liquid 15 milliLiter(s) Swish and Spit two times a day  chlorhexidine 4% Liquid 1 Application(s) Topical <User Schedule>  cholecalciferol 1000 Unit(s) Oral daily  citalopram 20 milliGRAM(s) Oral daily  docusate sodium Liquid 5 milliGRAM(s) Oral two times a day  enoxaparin Injectable 40 milliGRAM(s) SubCutaneous daily  haloperidol    Concentrate 2 milliGRAM(s) Enteral Tube two times a day  levETIRAcetam  IVPB 1000 milliGRAM(s) IV Intermittent every 12 hours  midazolam Infusion 0.022 mG/kG/Hr (1.4 mL/Hr) IV Continuous <Continuous>  OLANZapine 5 milliGRAM(s) Oral two times a day  pantoprazole   Suspension 40 milliGRAM(s) Oral before breakfast  simvastatin 40 milliGRAM(s) Oral at bedtime  sodium chloride 0.9%. 1000 milliLiter(s) (100 mL/Hr) IV Continuous <Continuous>    MEDICATIONS  (PRN):    Radiology:  CXr: ETT ok; Worsening left base opacity Patient is a 67y old  Female who presents with a chief complaint of choking, acute respiratory failure, intubated s/p cp arrest (08 Dec 2018 21:25)      OVER NIGHT EVENTS:    On VEEG; No overnight events; Febrile to 102.2;  Versed;     PHYSICAL EXAM    ICU Vital Signs Last 24 Hrs  T(C): 37.4 (09 Dec 2018 08:00), Max: 39 (08 Dec 2018 16:00)  T(F): 99.4 (09 Dec 2018 08:00), Max: 102.2 (08 Dec 2018 16:00)  HR: 75 (09 Dec 2018 08:49) (69 - 98)  BP: 132/53 (09 Dec 2018 08:00) (78/39 - 140/57)  BP(mean): 82 (09 Dec 2018 08:00) (51 - 87)  RR: 25 (09 Dec 2018 08:00) (18 - 28)  SpO2: 100% (09 Dec 2018 08:49) (94% - 100%)    I&O's Detail    08 Dec 2018 07:01  -  09 Dec 2018 07:00  --------------------------------------------------------  IN:    IV PiggyBack: 1500 mL    midazolam Infusion: 1.9 mL    midazolam Infusion: 20.9 mL    midazolam Infusion: 20.9 mL    Oral Fluid: 110 mL    Sodium Chloride 0.9% IV Bolus: 1000 mL    sodium chloride 0.9%.: 2100 mL  Total IN: 4753.7 mL    OUT:    Indwelling Catheter - Urethral: 1040 mL    Nasoenteral Tube: 500 mL  Total OUT: 1540 mL  Total NET: 3213.7 mL    09 Dec 2018 07:01  -  09 Dec 2018 08:55  --------------------------------------------------------  IN:    midazolam Infusion: 3.8 mL    sodium chloride 0.9%.: 200 mL  Total IN: 203.8 mL    OUT:    Indwelling Catheter - Urethral: 75 mL  Total OUT: 75 mL    Total NET: 128.8 mL    General: Comfortable in bed; Sedated   HEENT:  ET +  Lymph node: No palpable LN             Lungs: dec bs l side  Cardiovascular: RRR, S1S2  Abdomen: BS+ve; soft non tender  Extremities: No LE edema  Skin:  No evident Rash  Neurological:  Withdraws to pain UE      LABS:                          11.5   14.23 )-----------( 190      ( 09 Dec 2018 04:15 )             34.0   12-09    144  |  108  |  14  ----------------------------<  96  3.9   |  22  |  0.5<L>    Ca    7.9<L>      09 Dec 2018 04:15  Phos  3.0     12-07  Mg     1.6     12-07    Urinalysis Basic - ( 08 Dec 2018 23:55 )    Color: Dark Yellow / Appearance: Cloudy / SG: >=1.030 / pH: x  Gluc: x / Ketone: Trace  / Bili: Negative / Urobili: 1.0   Blood: x / Protein: 30 / Nitrite: Negative   Leuk Esterase: Small / RBC: >50 /HPF / WBC 6-10 /HPF   Sq Epi: x / Non Sq Epi: x / Bacteria: x    CARDIAC MARKERS ( 08 Dec 2018 00:19 )  x     / 0.06 ng/mL / 578 U/L / x     / 11.0 ng/mL  CARDIAC MARKERS ( 07 Dec 2018 20:47 )  x     / 0.07 ng/mL / 555 U/L / x     / 11.7 ng/mL    Lactate (12-07-18 @ 20:47): 1.7  Lactate (12-07-18 @ 11:52): 3.5<H>  Lactate (12-07-18 @ 08:54): 8.7<H>      Mode: AC/ CMV (Assist Control/ Continuous Mandatory Ventilation)  RR (machine): 18  TV (machine): 450  FiO2: 40  PEEP: 5  ITime: 1  MAP: 9  PIP: 19    ABG - ( 09 Dec 2018 07:46 )  pH, Arterial: 7.41  pH, Blood: x     /  pCO2: 34    /  pO2: 90    / HCO3: 22    / Base Excess: -2.2  /  SaO2: 97        MEDICATIONS  (STANDING):  ampicillin/sulbactam  IVPB      ampicillin/sulbactam  IVPB 3 Gram(s) IV Intermittent every 6 hours  artificial  tears Solution 1 Drop(s) Both EYES three times a day  benztropine 1 milliGRAM(s) Oral two times a day  chlorhexidine 0.12% Liquid 15 milliLiter(s) Swish and Spit two times a day  chlorhexidine 4% Liquid 1 Application(s) Topical <User Schedule>  cholecalciferol 1000 Unit(s) Oral daily  citalopram 20 milliGRAM(s) Oral daily  docusate sodium Liquid 5 milliGRAM(s) Oral two times a day  enoxaparin Injectable 40 milliGRAM(s) SubCutaneous daily  haloperidol    Concentrate 2 milliGRAM(s) Enteral Tube two times a day  levETIRAcetam  IVPB 1000 milliGRAM(s) IV Intermittent every 12 hours  midazolam Infusion 0.022 mG/kG/Hr (1.4 mL/Hr) IV Continuous <Continuous>  OLANZapine 5 milliGRAM(s) Oral two times a day  pantoprazole   Suspension 40 milliGRAM(s) Oral before breakfast  simvastatin 40 milliGRAM(s) Oral at bedtime  sodium chloride 0.9%. 1000 milliLiter(s) (100 mL/Hr) IV Continuous <Continuous>    MEDICATIONS  (PRN):    Radiology:  CXr: ETT ok; Worsening left base opacity

## 2018-12-10 LAB
ANION GAP SERPL CALC-SCNC: 13 MMOL/L — SIGNIFICANT CHANGE UP (ref 7–14)
ANION GAP SERPL CALC-SCNC: 13 MMOL/L — SIGNIFICANT CHANGE UP (ref 7–14)
BASE EXCESS BLDA CALC-SCNC: 0.7 MMOL/L — SIGNIFICANT CHANGE UP (ref -2–2)
BASE EXCESS BLDA CALC-SCNC: 1.2 MMOL/L — SIGNIFICANT CHANGE UP (ref -2–2)
BASOPHILS # BLD AUTO: 0.04 K/UL — SIGNIFICANT CHANGE UP (ref 0–0.2)
BASOPHILS NFR BLD AUTO: 0.3 % — SIGNIFICANT CHANGE UP (ref 0–1)
BLD GP AB SCN SERPL QL: SIGNIFICANT CHANGE UP
BUN SERPL-MCNC: 15 MG/DL — SIGNIFICANT CHANGE UP (ref 10–20)
BUN SERPL-MCNC: 15 MG/DL — SIGNIFICANT CHANGE UP (ref 10–20)
CALCIUM SERPL-MCNC: 8.2 MG/DL — LOW (ref 8.5–10.1)
CALCIUM SERPL-MCNC: 8.5 MG/DL — SIGNIFICANT CHANGE UP (ref 8.5–10.1)
CHLORIDE SERPL-SCNC: 108 MMOL/L — SIGNIFICANT CHANGE UP (ref 98–110)
CHLORIDE SERPL-SCNC: 108 MMOL/L — SIGNIFICANT CHANGE UP (ref 98–110)
CO2 SERPL-SCNC: 22 MMOL/L — SIGNIFICANT CHANGE UP (ref 17–32)
CO2 SERPL-SCNC: 22 MMOL/L — SIGNIFICANT CHANGE UP (ref 17–32)
CREAT SERPL-MCNC: <0.5 MG/DL — LOW (ref 0.7–1.5)
CREAT SERPL-MCNC: <0.5 MG/DL — LOW (ref 0.7–1.5)
EOSINOPHIL # BLD AUTO: 0.01 K/UL — SIGNIFICANT CHANGE UP (ref 0–0.7)
EOSINOPHIL NFR BLD AUTO: 0.1 % — SIGNIFICANT CHANGE UP (ref 0–8)
GLUCOSE BLDC GLUCOMTR-MCNC: 108 MG/DL — HIGH (ref 70–99)
GLUCOSE BLDC GLUCOMTR-MCNC: 150 MG/DL — HIGH (ref 70–99)
GLUCOSE SERPL-MCNC: 120 MG/DL — HIGH (ref 70–99)
GLUCOSE SERPL-MCNC: 140 MG/DL — HIGH (ref 70–99)
HCO3 BLDA-SCNC: 24 MMOL/L — SIGNIFICANT CHANGE UP (ref 21–29)
HCO3 BLDA-SCNC: 24 MMOL/L — SIGNIFICANT CHANGE UP (ref 21–29)
HCT VFR BLD CALC: 31.9 % — LOW (ref 37–47)
HGB BLD-MCNC: 10.7 G/DL — LOW (ref 12–16)
IMM GRANULOCYTES NFR BLD AUTO: 1.5 % — HIGH (ref 0.1–0.3)
LYMPHOCYTES # BLD AUTO: 0.77 K/UL — LOW (ref 1.2–3.4)
LYMPHOCYTES # BLD AUTO: 6 % — LOW (ref 20.5–51.1)
MAGNESIUM SERPL-MCNC: 2.4 MG/DL — SIGNIFICANT CHANGE UP (ref 1.8–2.4)
MCHC RBC-ENTMCNC: 30 PG — SIGNIFICANT CHANGE UP (ref 27–31)
MCHC RBC-ENTMCNC: 33.5 G/DL — SIGNIFICANT CHANGE UP (ref 32–37)
MCV RBC AUTO: 89.4 FL — SIGNIFICANT CHANGE UP (ref 81–99)
MONOCYTES # BLD AUTO: 0.52 K/UL — SIGNIFICANT CHANGE UP (ref 0.1–0.6)
MONOCYTES NFR BLD AUTO: 4 % — SIGNIFICANT CHANGE UP (ref 1.7–9.3)
NEUTROPHILS # BLD AUTO: 11.37 K/UL — HIGH (ref 1.4–6.5)
NEUTROPHILS NFR BLD AUTO: 88.1 % — HIGH (ref 42.2–75.2)
NRBC # BLD: 0 /100 WBCS — SIGNIFICANT CHANGE UP (ref 0–0)
PCO2 BLDA: 34 MMHG — LOW (ref 38–42)
PCO2 BLDA: 34 MMHG — LOW (ref 38–42)
PH BLDA: 7.46 — HIGH (ref 7.38–7.42)
PH BLDA: 7.47 — HIGH (ref 7.38–7.42)
PLATELET # BLD AUTO: 202 K/UL — SIGNIFICANT CHANGE UP (ref 130–400)
PO2 BLDA: 113 MMHG — HIGH (ref 78–95)
PO2 BLDA: 69 MMHG — LOW (ref 78–95)
POTASSIUM SERPL-MCNC: 3.1 MMOL/L — LOW (ref 3.5–5)
POTASSIUM SERPL-MCNC: 3.3 MMOL/L — LOW (ref 3.5–5)
POTASSIUM SERPL-SCNC: 3.1 MMOL/L — LOW (ref 3.5–5)
POTASSIUM SERPL-SCNC: 3.3 MMOL/L — LOW (ref 3.5–5)
RBC # BLD: 3.57 M/UL — LOW (ref 4.2–5.4)
RBC # FLD: 13.6 % — SIGNIFICANT CHANGE UP (ref 11.5–14.5)
SAO2 % BLDA: 95 % — SIGNIFICANT CHANGE UP (ref 92–96)
SAO2 % BLDA: 98 % — HIGH (ref 92–96)
SODIUM SERPL-SCNC: 143 MMOL/L — SIGNIFICANT CHANGE UP (ref 135–146)
SODIUM SERPL-SCNC: 143 MMOL/L — SIGNIFICANT CHANGE UP (ref 135–146)
TYPE + AB SCN PNL BLD: SIGNIFICANT CHANGE UP
WBC # BLD: 12.91 K/UL — HIGH (ref 4.8–10.8)
WBC # FLD AUTO: 12.91 K/UL — HIGH (ref 4.8–10.8)

## 2018-12-10 RX ORDER — POTASSIUM CHLORIDE 20 MEQ
20 PACKET (EA) ORAL ONCE
Qty: 0 | Refills: 0 | Status: COMPLETED | OUTPATIENT
Start: 2018-12-10 | End: 2018-12-10

## 2018-12-10 RX ORDER — ACETAMINOPHEN 500 MG
650 TABLET ORAL EVERY 6 HOURS
Qty: 0 | Refills: 0 | Status: DISCONTINUED | OUTPATIENT
Start: 2018-12-10 | End: 2018-12-14

## 2018-12-10 RX ADMIN — CHLORHEXIDINE GLUCONATE 15 MILLILITER(S): 213 SOLUTION TOPICAL at 17:08

## 2018-12-10 RX ADMIN — Medication 100 MILLIGRAM(S): at 21:10

## 2018-12-10 RX ADMIN — OLANZAPINE 5 MILLIGRAM(S): 15 TABLET, FILM COATED ORAL at 05:32

## 2018-12-10 RX ADMIN — LEVETIRACETAM 400 MILLIGRAM(S): 250 TABLET, FILM COATED ORAL at 05:32

## 2018-12-10 RX ADMIN — AMPICILLIN SODIUM AND SULBACTAM SODIUM 200 GRAM(S): 250; 125 INJECTION, POWDER, FOR SUSPENSION INTRAMUSCULAR; INTRAVENOUS at 00:38

## 2018-12-10 RX ADMIN — ENOXAPARIN SODIUM 40 MILLIGRAM(S): 100 INJECTION SUBCUTANEOUS at 11:30

## 2018-12-10 RX ADMIN — AMPICILLIN SODIUM AND SULBACTAM SODIUM 200 GRAM(S): 250; 125 INJECTION, POWDER, FOR SUSPENSION INTRAMUSCULAR; INTRAVENOUS at 11:29

## 2018-12-10 RX ADMIN — Medication 1 DROP(S): at 21:10

## 2018-12-10 RX ADMIN — PANTOPRAZOLE SODIUM 40 MILLIGRAM(S): 20 TABLET, DELAYED RELEASE ORAL at 06:05

## 2018-12-10 RX ADMIN — Medication 20 MILLIEQUIVALENT(S): at 17:48

## 2018-12-10 RX ADMIN — Medication 100 MILLIGRAM(S): at 05:32

## 2018-12-10 RX ADMIN — SIMVASTATIN 40 MILLIGRAM(S): 20 TABLET, FILM COATED ORAL at 21:10

## 2018-12-10 RX ADMIN — Medication 1 DROP(S): at 05:31

## 2018-12-10 RX ADMIN — Medication 1000 UNIT(S): at 13:29

## 2018-12-10 RX ADMIN — AMPICILLIN SODIUM AND SULBACTAM SODIUM 200 GRAM(S): 250; 125 INJECTION, POWDER, FOR SUSPENSION INTRAMUSCULAR; INTRAVENOUS at 17:48

## 2018-12-10 RX ADMIN — AMPICILLIN SODIUM AND SULBACTAM SODIUM 200 GRAM(S): 250; 125 INJECTION, POWDER, FOR SUSPENSION INTRAMUSCULAR; INTRAVENOUS at 05:32

## 2018-12-10 RX ADMIN — Medication 100 MILLIGRAM(S): at 13:29

## 2018-12-10 RX ADMIN — Medication 1 MILLIGRAM(S): at 05:32

## 2018-12-10 RX ADMIN — Medication 50 MILLIEQUIVALENT(S): at 06:23

## 2018-12-10 RX ADMIN — CHLORHEXIDINE GLUCONATE 15 MILLILITER(S): 213 SOLUTION TOPICAL at 05:32

## 2018-12-10 RX ADMIN — LEVETIRACETAM 400 MILLIGRAM(S): 250 TABLET, FILM COATED ORAL at 17:08

## 2018-12-10 RX ADMIN — HALOPERIDOL DECANOATE 2 MILLIGRAM(S): 100 INJECTION INTRAMUSCULAR at 05:32

## 2018-12-10 RX ADMIN — Medication 650 MILLIGRAM(S): at 23:07

## 2018-12-10 RX ADMIN — Medication 1 DROP(S): at 13:29

## 2018-12-10 RX ADMIN — AMPICILLIN SODIUM AND SULBACTAM SODIUM 200 GRAM(S): 250; 125 INJECTION, POWDER, FOR SUSPENSION INTRAMUSCULAR; INTRAVENOUS at 23:08

## 2018-12-10 RX ADMIN — CHLORHEXIDINE GLUCONATE 1 APPLICATION(S): 213 SOLUTION TOPICAL at 11:24

## 2018-12-10 NOTE — DIETITIAN INITIAL EVALUATION ADULT. - MD RECOMMEND
Recommendation: (1) Monitor Mg/PO4/K & replete as needed. (2) Order Prosource TF q12hrs. (3) Continue providing Osmolite 1.5 at 40 mL/hr as tolerated. With the addition of Prosource TF q12hrs, regimen will provide 1530 kcal, 82 g protein, ~818 mL free H2O. Flushes per LIP.

## 2018-12-10 NOTE — CHART NOTE - NSCHARTNOTEFT_GEN_A_CORE
Patient's cousin Ms. Rhianna Rosado signed the DNR form. She would like comfort measures at this time and would like the patient to remain intubated with tube feeds. She does not want a PEG tube, no invasive resuscitation/procedures, no CPR, no antibiotics. She agrees to tube feeds, mechanical ventilation support, and IVF only. Patient's cousin Ms. Rhianna Rosado signed the DNR form. She would like comfort measures at this time and would like the patient to remain intubated with tube feeds. She does not want a PEG tube, no invasive resuscitation/procedures, no CPR, no antibiotics. She agrees to tube feeds, mechanical ventilation support, and IVF only. Patient has no living children or parents and is from [facility] home. Her health care proxy is currently hospitalized and is ill. Her cousin is managing her Aunt's care and now patient's care as well.

## 2018-12-10 NOTE — DIETITIAN INITIAL EVALUATION ADULT. - SOURCE
Unable to obtain nutrition history at this time. Pt intubated currently; no family present at this time. NKFA per chart.

## 2018-12-10 NOTE — PROGRESS NOTE ADULT - ASSESSMENT
IMPRESSION:    Acute hypoxemic respiratory failure  SP Code Blue and Aspiration  Anoxic brain injury   HO schizophrenia    PLAN:    CNS: No sedation.  FU MS.  FU with Neuro     HEENT: Oral care    PULMONARY:  HOB @ 45 degrees.  Vent changes as follows: Decrease RR 14 FiO2 30.  Might need bronch.      CARDIOVASCULAR: i=O    GI: GI prophylaxis.  OG Feeding     RENAL:  Follow up lytes.  Correct as needed    INFECTIOUS DISEASE: Follow up cultures.  Continue ABX     HEMATOLOGICAL:  DVT prophylaxis.    ENDOCRINE:  Follow up FS.  Insulin protocol if needed.    MUSCULOSKELETAL:    Overall prognosis poor     Advance directives.

## 2018-12-10 NOTE — DIETITIAN INITIAL EVALUATION ADULT. - DIET TYPE
NPO with tube feedings/Osmolite 1.5 at goal rate 40 mL/hr ordered 12/9. Goal rate achieved at this time. Will provide 1440 kcal, 60 g protein, 730 mL free H2O.

## 2018-12-10 NOTE — DIETITIAN INITIAL EVALUATION ADULT. - OTHER INFO
Reason for RD assessment: Intubated >48 hours + new EN initiated. Pertinent Medical Information: Pt brought to the ED by EMS after cardiac arrest. Pt was eating a bagel when she choked and collapsed. Heimlich maneuver was performed and CPR was initiated as she was pulseless. EMS arrived after around 10 mins. ROSC was established after pushing 1 dose of epi. She was intubated on site and brought to ED. Acute hypoxemic respiratory failure: on vent. Aspiration PNA: improving. Anoxic Brain Injury. Chronic tardive dyskinesia.

## 2018-12-10 NOTE — PROGRESS NOTE ADULT - SUBJECTIVE AND OBJECTIVE BOX
________________________________________________________________________________  DAILY PROGRESS NOTE:    ================== SUBJECTIVE ==================    ANNETTE RAMIREZ  /   67y  /  Female  /  MRN#: [id number]  Patient is a 67y old Female who presents with a chief complaint of choking, acute respiratory failure, intubated (10 Dec 2018 08:53)  Currently admitted to medicine with the primary diagnosis of Cardiac arrest      HOSPITAL DAY: 3d     ICU DAY: 3d    OVERNIGHT EVENTS: Patient with temp down to 95.7, bear hugger was placed, temp improved after, Tmax of 99.5. Per nurse, patient has + gag and intermittently withdraws from pain in left upper extremity, PERRL, no corneal reflex. Sedation has been off since 2PM 12/09/18.     TODAY: Neurological status has not improved despite having sedation off since yesterday at 2PM. Patient did not withdraw from pain on my examination. PERRL. Grave prognosis 2/2 anoxic brain injury and cardiac arrest. Will discussed advanced directives with patient's cousin/family today.    ================= PRESENT TODAY ==================    Rosario- 1330cc out in 24 hrs, >50cc/hr  PIVS, all sedations is off  OG tube- 40/hr, at goal  Intubated  ================= OBJECTIVE ===================    VITAL SIGNS: Last 24 Hours  T(C): 37.5 (10 Dec 2018 08:00), Max: 37.9 (09 Dec 2018 12:00)  T(F): 99.5 (10 Dec 2018 08:00), Max: 100.3 (09 Dec 2018 12:00)  HR: 82 (10 Dec 2018 11:00) (74 - 96)  BP: 132/55 (10 Dec 2018 11:00) (105/51 - 159/74)  BP(mean): 86 (10 Dec 2018 11:00) (68 - 111)  RR: 25 (10 Dec 2018 11:00) (19 - 29)  SpO2: 96% (10 Dec 2018 11:00) (96% - 99%)    12-09-18 @ 07:01  -  12-10-18 @ 07:00  --------------------------------------------------------  IN: 2553.3 mL / OUT: 1330 mL / NET: 1223.3 mL    12-10-18 @ 07:01  -  12-10-18 @ 11:43  --------------------------------------------------------  IN: 260 mL / OUT: 210 mL / NET: 50 mL    PHYSICAL EXAM:  GENERAL: Intubated, OG tube in place  HEAD:  Atraumatic, Normocephalic  EYES: PERRL, conjunctiva and sclera clear  NECK: Supple  CHEST/LUNG: Clear to auscultation bilaterally; No wheeze  HEART: Regular rate and rhythm; No murmurs, rubs, or gallops  ABDOMEN: Soft, Nontender, Nondistended  EXTREMITIES:  2+ Peripheral Pulses, No edema  NEUROLOGY: +gag reflex, PERRL, intermittently withdraws from painful stimuli of UE but not LE  SKIN: No rashes or lesions    ===================== LABS =====================                        10.7   12.91 )-----------( 202      ( 10 Dec 2018 04:16 )             31.9     12-10    143  |  108  |  15  ----------------------------<  140<H>  3.3<L>   |  22  |  <0.5<L>    Ca    8.5      10 Dec 2018 04:16  Mg     2.4     12-10        Urinalysis Basic - ( 08 Dec 2018 23:55 )    Color: Dark Yellow / Appearance: Cloudy / SG: >=1.030 / pH: x  Gluc: x / Ketone: Trace  / Bili: Negative / Urobili: 1.0   Blood: x / Protein: 30 / Nitrite: Negative   Leuk Esterase: Small / RBC: >50 /HPF / WBC 6-10 /HPF   Sq Epi: x / Non Sq Epi: x / Bacteria: x      ABG - ( 10 Dec 2018 07:52 )  pH, Arterial: 7.46  pH, Blood: x     /  pCO2: 34    /  pO2: 113   / HCO3: 24    / Base Excess: 0.7   /  SaO2: 98        ================= MICROBIOLOGY ================    Culture - Blood (collected 08 Dec 2018 21:30)  Source: .Blood None  Preliminary Report (10 Dec 2018 07:01):    No growth to date.    Culture - Blood (collected 08 Dec 2018 21:30)  Source: .Blood None  Preliminary Report (10 Dec 2018 07:01):    No growth to date.    ================== IMAGING TO DATE ==================  Findings:    Support devices: Endotracheal tube with tip above the dede at the level   of the clavicular heads. Enteric support tube tip overlying the left   upper quadrant.    Cardiac/mediastinum/hilum: Stable    Lung parenchyma/Pleura: There is a stable left basilar opacity. No   pneumothorax is seen.    Skeleton/soft tissues: Stable    Impression:    Stable left basilar opacity.    Support tubes as above.    ================== ALLERGIES ===================  No Known Allergies  ==================== MEDS =====================  ampicillin/sulbactam  IVPB      ampicillin/sulbactam  IVPB 3 Gram(s) IV Intermittent every 6 hours  artificial  tears Solution 1 Drop(s) Both EYES three times a day  chlorhexidine 0.12% Liquid 15 milliLiter(s) Swish and Spit two times a day  chlorhexidine 4% Liquid 1 Application(s) Topical <User Schedule>  cholecalciferol 1000 Unit(s) Oral daily  docusate sodium Liquid 100 milliGRAM(s) Oral every 8 hours  enoxaparin Injectable 40 milliGRAM(s) SubCutaneous daily  levETIRAcetam  IVPB 1000 milliGRAM(s) IV Intermittent every 12 hours  midazolam Infusion 0.022 mG/kG/Hr IV Continuous <Continuous>  pantoprazole   Suspension 40 milliGRAM(s) Oral before breakfast  potassium chloride   Powder 20 milliEquivalent(s) Oral once  simvastatin 40 milliGRAM(s) Oral at bedtime    ================= CONSULTS ==================  Neurology  ================= ASSESS/PLAN ==================  Patient is a 67y old Female who presents with a chief complaint of choking, acute respiratory failure, intubated  Currently admitted to medicine with the primary diagnosis of Cardiac arrest    PLAN  66 y/o F, with PMH listed below including HTN and schizophrenia, lives at [facility], brought to the ED by EMS after cardiac arrest. On day of presentation, she was eating a bagel when she choked and collapsed. Heimlich maneuver was performed and CPR was initiated as she was pulseless. EMS arrived after around 10 mins. ROSC was established after pushing 1 dose of epi. She was intubated on site and brought to ED.     # Acute hypoxemic respiratory failure: on vent  cardiac arrest secondary to foreign body airway obstruction after choking while eating bagel  bagel seen in pharynx during intubation as per EMS  On mechanical ventilation  sedated with Versed  CTH negative    # Aspiration PNA: improving  Continue Augmentin  Tmax 100.3F   WBC trending down    # Anoxic Brain Injury:   EEG: generalized slowing consistent with diffuse cerebral dysfunction  Neuro f/u: - continue keppra 1g Q12, keep Mg > 2.0, continue sedation for now, continue supportive care.  CTH negative  Repeat CT head pending    # Schizophrenia  c/w haloperidol and olanzapine    # Depression  c/w citalopram    # GERD:  c/w ppi    # Chronic tardive dyskinesia   due to antidopaminergic meds  lip smacking tongue movements chronic as per family  unlikely due to seizures    dvt ppx- lovenox 40 q24  ng tube feed  code status: ________________________________________________________________________________  DAILY PROGRESS NOTE:    ================== SUBJECTIVE ==================    ANNETTE RAMIREZ  /   67y  /  Female  /  MRN#: [id number]  Patient is a 67y old Female who presents with a chief complaint of choking, acute respiratory failure, intubated.  Currently admitted to medicine with the primary diagnosis of Cardiac arrest.    HOSPITAL DAY: 3d     SUMMARY:  66 y/o F, with PMH listed below including HTN and schizophrenia, lives at [facility], brought to the ED by EMS after cardiac arrest. On day of presentation, she was eating a bagel when she choked and collapsed. Heimlich maneuver was performed and CPR was initiated as she was pulseless. EMS arrived after around 10 mins. ROSC was established after pushing 1 dose of epi. She was intubated on site and brought to ED. S/p EEG- slowing and diffuse cerebral dysfunction. Off sedation, intubated, NG tube feeds, no pressors. Repeat CTH shows diffuse hypoxic/ischemic brain injury (diffuse loss of gray white differentiation, hypoattenuation of the deep gray nuclei, diffuse cerebral edema). Very poor prognosis. Negative cxs. Continue to monitor and will discuss advanced care with family.    ICU DAY: 3d    OVERNIGHT EVENTS: Patient with temp down to 95.7, bear hugger was placed, temp improved after, Tmax of 99.5. Per nurse, patient has + gag and intermittently withdraws from pain in left upper extremity, PERRL, no corneal reflex. Sedation has been off since 2PM 12/09/18. No pressors, IV locked.     TODAY: Neurological status has not improved despite having sedation off since yesterday at 2PM. Patient did not withdraw from pain on my examination. PERRL. Grave prognosis 2/2 anoxic brain injury and cardiac arrest. Will discuss advanced directives with patient's cousin/family today and appreciate neuro recs.    ================= PRESENT TODAY ==================  Rosario- 1330cc out in 24 hrs, >50cc/hr  PIVS, all sedations is off  NG tube- 40/hr, at goal  Intubated  ================= OBJECTIVE ===================    VITAL SIGNS: Last 24 Hours  T(C): 37.5 (10 Dec 2018 08:00), Max: 37.9 (09 Dec 2018 12:00)  T(F): 99.5 (10 Dec 2018 08:00), Max: 100.3 (09 Dec 2018 12:00)  HR: 82 (10 Dec 2018 11:00) (74 - 96)  BP: 132/55 (10 Dec 2018 11:00) (105/51 - 159/74)  BP(mean): 86 (10 Dec 2018 11:00) (68 - 111)  RR: 25 (10 Dec 2018 11:00) (19 - 29)  SpO2: 96% (10 Dec 2018 11:00) (96% - 99%)    12-09-18 @ 07:01  -  12-10-18 @ 07:00  --------------------------------------------------------  IN: 2553.3 mL / OUT: 1330 mL / NET: 1223.3 mL    12-10-18 @ 07:01  -  12-10-18 @ 11:43  --------------------------------------------------------  IN: 260 mL / OUT: 210 mL / NET: 50 mL    PHYSICAL EXAM:  GENERAL: Intubated, OG tube in place  HEAD:  Atraumatic, Normocephalic  EYES: PERRL, conjunctiva and sclera clear  NECK: Supple  CHEST/LUNG: Clear to auscultation bilaterally; No wheeze  HEART: Regular rate and rhythm; No murmurs, rubs, or gallops  ABDOMEN: Soft, Nontender, Nondistended  EXTREMITIES:  2+ Peripheral Pulses, No edema  NEUROLOGY: +gag reflex, PERRL, intermittently withdraws from painful stimuli of UE but not LE  SKIN: No rashes or lesions    ===================== LABS =====================             10.7   12.91 )-----------( 202      ( 10 Dec 2018 04:16 )             31.9     12-10    143  |  108  |  15  ----------------------------<  140<H>  3.3<L>   |  22  |  <0.5<L>    Ca    8.5      10 Dec 2018 04:16  Mg     2.4     12-10    Urinalysis Basic - ( 08 Dec 2018 23:55 )    Color: Dark Yellow / Appearance: Cloudy / SG: >=1.030 / pH: x  Gluc: x / Ketone: Trace  / Bili: Negative / Urobili: 1.0   Blood: x / Protein: 30 / Nitrite: Negative   Leuk Esterase: Small / RBC: >50 /HPF / WBC 6-10 /HPF   Sq Epi: x / Non Sq Epi: x / Bacteria: x    ABG - ( 10 Dec 2018 07:52 )  pH, Arterial: 7.46  pH, Blood: x    /  pCO2: 34    /  pO2: 113   / HCO3: 24    / Base Excess: 0.7   /  SaO2: 98        ================= MICROBIOLOGY ================    Culture - Blood (collected 08 Dec 2018 21:30)  Source: .Blood None  Preliminary Report (10 Dec 2018 07:01):    No growth to date.    Culture - Blood (collected 08 Dec 2018 21:30)  Source: .Blood None  Preliminary Report (10 Dec 2018 07:01):    No growth to date.    ================== IMAGING TO DATE ==================  Findings:    Support devices: Endotracheal tube with tip above the dede at the level   of the clavicular heads. Enteric support tube tip overlying the left   upper quadrant.    Cardiac/mediastinum/hilum: Stable    Lung parenchyma/Pleura: There is a stable left basilar opacity. No   pneumothorax is seen.    Skeleton/soft tissues: Stable    Impression:    Stable left basilar opacity.    Support tubes as above.    ================== ALLERGIES ===================  No Known Allergies    ==================== MEDS =====================  ampicillin/sulbactam  IVPB      ampicillin/sulbactam  IVPB 3 Gram(s) IV Intermittent every 6 hours  artificial  tears Solution 1 Drop(s) Both EYES three times a day  chlorhexidine 0.12% Liquid 15 milliLiter(s) Swish and Spit two times a day  chlorhexidine 4% Liquid 1 Application(s) Topical <User Schedule>  cholecalciferol 1000 Unit(s) Oral daily  docusate sodium Liquid 100 milliGRAM(s) Oral every 8 hours  enoxaparin Injectable 40 milliGRAM(s) SubCutaneous daily  levETIRAcetam  IVPB 1000 milliGRAM(s) IV Intermittent every 12 hours  midazolam Infusion 0.022 mG/kG/Hr IV Continuous <Continuous>  pantoprazole   Suspension 40 milliGRAM(s) Oral before breakfast  potassium chloride   Powder 20 milliEquivalent(s) Oral once  simvastatin 40 milliGRAM(s) Oral at bedtime    ================= CONSULTS ==================  Neurology  ================= ASSESS/PLAN ==================  Patient is a 67y old Female who presents with a chief complaint of choking, acute respiratory failure, intubated.  Currently admitted to medicine with the primary diagnosis of cardiac arrest.    PLAN  66 y/o F, with PMH listed below including HTN and schizophrenia, lives at [facility], brought to the ED by EMS after cardiac arrest. On day of presentation, she was eating a bagel when she choked and collapsed. Heimlich maneuver was performed and CPR was initiated as she was pulseless. EMS arrived after around 10 mins. ROSC was established after pushing 1 dose of epi. She was intubated on site and brought to ED.     # Acute hypoxemic respiratory failure: on vent  Cardiac arrest secondary to foreign body airway obstruction after choking while eating  On mechanical ventilation  Off sedation  CTH- diffuse hypoxic/ischemic brain injury (diffuse loss of gray white differentiation, hypoattenuation of the deep gray nuclei, diffuse cerebral edema).    # Anoxic Brain Injury:   EEG: generalized slowing consistent with diffuse cerebral dysfunction.  Neuro f/u: - continue keppra 1g Q12, keep Mg > 2.0, continue sedation for now, continue supportive care. Recommending MRI if/when possible.    # Bibasilar Opacities, possible aspiration PNA: improving  CXR- Bibasilar opacities left greater than right, stable.  Continue Unasyn  WBC trending down     # Schizophrenia  Will D/C haloperidol and olanzapine as patient is actively being monitored for seizure-like activity    # Depression  D/C citalopram as patient is actively being monitored for seizure-like activity    # GERD:  C/w ppi    # Chronic tardive dyskinesia   Due to antidopaminergic meds- lip smacking tongue movements chronic as per family, unlikely due to seizures    dvt ppx- lovenox 40 q24  ng tube feed  code status: DNR as of 12/10/18 after discussing at length with patient's cousin, would like to continue comfort measures for now

## 2018-12-10 NOTE — PROGRESS NOTE ADULT - SUBJECTIVE AND OBJECTIVE BOX
Patient is a 67y old  Female who presents with a chief complaint of choking, acute respiratory failure, intubated (09 Dec 2018 13:10)        Over Night Events: On MV.  Off pressors.  Off sedation 16 hours.          ROS:  See HPI    PHYSICAL EXAM    ICU Vital Signs Last 24 Hrs  T(C): 37.5 (10 Dec 2018 08:00), Max: 37.9 (09 Dec 2018 12:00)  T(F): 99.5 (10 Dec 2018 08:00), Max: 100.3 (09 Dec 2018 12:00)  HR: 82 (10 Dec 2018 08:00) (74 - 96)  BP: 143/67 (10 Dec 2018 08:00) (105/51 - 159/74)  BP(mean): 95 (10 Dec 2018 08:00) (68 - 111)  ABP: --  ABP(mean): --  RR: 22 (10 Dec 2018 08:00) (18 - 29)  SpO2: 99% (10 Dec 2018 08:00) (97% - 100%)      General: some response to pain.   HEENT: + ET         Lymphatic system: No cervical LN   Lungs: Bilateral BS  Cardiovascular: Regular   Gastrointestinal: Soft, Positive BS  Extremities: No clubbing.    Full Range of motion   Skin: Warm, intact  Neurological: withdraws to pain JME      12-09-18 @ 07:01  -  12-10-18 @ 07:00  --------------------------------------------------------  IN:    IV PiggyBack: 600 mL    midazolam Infusion: 13.3 mL    Oral Fluid: 100 mL    Osmolite: 440 mL    sodium chloride 0.9%: 1400 mL  Total IN: 2553.3 mL    OUT:    Indwelling Catheter - Urethral: 1330 mL  Total OUT: 1330 mL    Total NET: 1223.3 mL      12-10-18 @ 07:01  -  12-10-18 @ 08:53  --------------------------------------------------------  IN:    Osmolite: 40 mL  Total IN: 40 mL    OUT:    Indwelling Catheter - Urethral: 80 mL  Total OUT: 80 mL    Total NET: -40 mL          LABS:                            10.7   12.91 )-----------( 202      ( 10 Dec 2018 04:16 )             31.9                                               12-10    143  |  108  |  15  ----------------------------<  140<H>  3.3<L>   |  22  |  <0.5<L>    Ca    8.5      10 Dec 2018 04:16  Mg     2.4     12-10                                               Urinalysis Basic - ( 08 Dec 2018 23:55 )    Color: Dark Yellow / Appearance: Cloudy / SG: >=1.030 / pH: x  Gluc: x / Ketone: Trace  / Bili: Negative / Urobili: 1.0   Blood: x / Protein: 30 / Nitrite: Negative   Leuk Esterase: Small / RBC: >50 /HPF / WBC 6-10 /HPF   Sq Epi: x / Non Sq Epi: x / Bacteria: x                                                                                           Culture - Blood (collected 08 Dec 2018 21:30)  Source: .Blood None  Preliminary Report (10 Dec 2018 07:01):    No growth to date.    Culture - Blood (collected 08 Dec 2018 21:30)  Source: .Blood None  Preliminary Report (10 Dec 2018 07:01):    No growth to date.                                                   Mode: AC/ CMV (Assist Control/ Continuous Mandatory Ventilation)  RR (machine): 18  TV (machine): 400  FiO2: 40  PEEP: 5  ITime: 1  MAP: 8  PIP: 17                                      ABG - ( 10 Dec 2018 07:52 )  pH, Arterial: 7.46  pH, Blood: x     /  pCO2: 34    /  pO2: 113   / HCO3: 24    / Base Excess: 0.7   /  SaO2: 98                  MEDICATIONS  (STANDING):  ampicillin/sulbactam  IVPB      ampicillin/sulbactam  IVPB 3 Gram(s) IV Intermittent every 6 hours  artificial  tears Solution 1 Drop(s) Both EYES three times a day  benztropine 1 milliGRAM(s) Oral two times a day  chlorhexidine 0.12% Liquid 15 milliLiter(s) Swish and Spit two times a day  chlorhexidine 4% Liquid 1 Application(s) Topical <User Schedule>  cholecalciferol 1000 Unit(s) Oral daily  citalopram 20 milliGRAM(s) Oral daily  docusate sodium Liquid 100 milliGRAM(s) Oral every 8 hours  enoxaparin Injectable 40 milliGRAM(s) SubCutaneous daily  haloperidol    Concentrate 2 milliGRAM(s) Enteral Tube two times a day  levETIRAcetam  IVPB 1000 milliGRAM(s) IV Intermittent every 12 hours  midazolam Infusion 0.022 mG/kG/Hr (1.4 mL/Hr) IV Continuous <Continuous>  OLANZapine 5 milliGRAM(s) Oral two times a day  pantoprazole   Suspension 40 milliGRAM(s) Oral before breakfast  potassium chloride   Powder 20 milliEquivalent(s) Oral once  simvastatin 40 milliGRAM(s) Oral at bedtime    MEDICATIONS  (PRN):      Xrays:      ET high. LLL pleuropneumonic process.                                                                                 ECHO

## 2018-12-10 NOTE — CHART NOTE - NSCHARTNOTEFT_GEN_A_CORE
Discussed patient care and advanced directives at length with wife of patient's cousin, Ms. Rhianna Rosado. States she will discuss further with family but that this is no way to live and will likely plan to withdraw care at a later time after speaking more with her other cousins. Patient has no living parents, no children, and her health care proxy is her Aunt who is currently in the hospital with a grave prognosis, therefore her cousin is her only living family/next of kin.

## 2018-12-10 NOTE — PROGRESS NOTE ADULT - SUBJECTIVE AND OBJECTIVE BOX
Neurology Progress Note    Interval History:    patient is off sedation- on ventilator    HPI:  68 y/o F, with PMH listed below including HTN and schizophrenia, lives at [facility], brought to the ED by EMS after cardiac arrest. On day of presentation, she was eating a bagel when she choked and collapsed. Heimlich maneuver was performed and CPR was initiated as she was pulseless. EMS arrived after around 10 mins. ROSC was established after pushing 1 dose of epi. She was intubated on site and brought to ED.   spoke to patients cousin's wife (cousin is HCP), patient is a chronic smoker 1ppd for many years. (07 Dec 2018 14:30)      PAST MEDICAL & SURGICAL HISTORY:  Depressed  High cholesterol  Tardive dyskinesia  Schizophrenia  GERD (gastroesophageal reflux disease)  No significant past surgical history          Vital Signs Last 24 Hrs  T(C): 37.8 (10 Dec 2018 12:00), Max: 37.8 (10 Dec 2018 04:00)  T(F): 100 (10 Dec 2018 12:00), Max: 100 (10 Dec 2018 04:00)  HR: 84 (10 Dec 2018 13:00) (74 - 96)  BP: 139/54 (10 Dec 2018 13:00) (105/51 - 159/74)  BP(mean): 82 (10 Dec 2018 13:00) (68 - 111)  RR: 28 (10 Dec 2018 13:00) (19 - 29)  SpO2: 95% (10 Dec 2018 13:00) (95% - 99%)    Neurological Exam:   pt is in the propped up position- not maintaining any head control  cannot examine her pupils- as eyes are rolled up and could not visualize them-  eye examination difficult for the same reason  no response to painful stimuli  reflexes are absent      ampicillin/sulbactam  IVPB      ampicillin/sulbactam  IVPB 3 Gram(s) IV Intermittent every 6 hours  artificial  tears Solution 1 Drop(s) Both EYES three times a day  chlorhexidine 0.12% Liquid 15 milliLiter(s) Swish and Spit two times a day  chlorhexidine 4% Liquid 1 Application(s) Topical <User Schedule>  cholecalciferol 1000 Unit(s) Oral daily  docusate sodium Liquid 100 milliGRAM(s) Oral every 8 hours  enoxaparin Injectable 40 milliGRAM(s) SubCutaneous daily  levETIRAcetam  IVPB 1000 milliGRAM(s) IV Intermittent every 12 hours  pantoprazole   Suspension 40 milliGRAM(s) Oral before breakfast  potassium chloride   Powder 20 milliEquivalent(s) Oral once  simvastatin 40 milliGRAM(s) Oral at bedtime      Labs:  CBC Full  -  ( 10 Dec 2018 04:16 )  WBC Count : 12.91 K/uL  Hemoglobin : 10.7 g/dL  Hematocrit : 31.9 %  Platelet Count - Automated : 202 K/uL  Mean Cell Volume : 89.4 fL  Mean Cell Hemoglobin : 30.0 pg  Mean Cell Hemoglobin Concentration : 33.5 g/dL  Auto Neutrophil # : 11.37 K/uL  Auto Lymphocyte # : 0.77 K/uL  Auto Monocyte # : 0.52 K/uL  Auto Eosinophil # : 0.01 K/uL  Auto Basophil # : 0.04 K/uL  Auto Neutrophil % : 88.1 %  Auto Lymphocyte % : 6.0 %  Auto Monocyte % : 4.0 %  Auto Eosinophil % : 0.1 %  Auto Basophil % : 0.3 %    12-10    143  |  108  |  15  ----------------------------<  140<H>  3.3<L>   |  22  |  <0.5<L>    Ca    8.5      10 Dec 2018 04:16  Mg     2.4     12-10          Urinalysis Basic - ( 08 Dec 2018 23:55 )    Color: Dark Yellow / Appearance: Cloudy / SG: >=1.030 / pH: x  Gluc: x / Ketone: Trace  / Bili: Negative / Urobili: 1.0   Blood: x / Protein: 30 / Nitrite: Negative   Leuk Esterase: Small / RBC: >50 /HPF / WBC 6-10 /HPF   Sq Epi: x / Non Sq Epi: x / Bacteria: x        Assessment:  This is a 67y Female w/ h/o hypoxic- ischemic lexie injury-  now off sedation- no improvement in mental state- EEG shoed generalized slowing     MRI brain when possible-   prognosis for meaningful neurological recovery is guarded but appears poor

## 2018-12-10 NOTE — DIETITIAN INITIAL EVALUATION ADULT. - PHYSICAL APPEARANCE
BMI: 25.3. Intubated. Abd noted non-distended. Last BM date unknown, no BM noted this admit. Bowel sounds audible & active. NG tube noted. Skin: Ecchymotic.

## 2018-12-11 LAB
ANION GAP SERPL CALC-SCNC: 14 MMOL/L — SIGNIFICANT CHANGE UP (ref 7–14)
BUN SERPL-MCNC: 17 MG/DL — SIGNIFICANT CHANGE UP (ref 10–20)
CALCIUM SERPL-MCNC: 8.7 MG/DL — SIGNIFICANT CHANGE UP (ref 8.5–10.1)
CHLORIDE SERPL-SCNC: 107 MMOL/L — SIGNIFICANT CHANGE UP (ref 98–110)
CO2 SERPL-SCNC: 23 MMOL/L — SIGNIFICANT CHANGE UP (ref 17–32)
CREAT SERPL-MCNC: 0.5 MG/DL — LOW (ref 0.7–1.5)
GLUCOSE SERPL-MCNC: 124 MG/DL — HIGH (ref 70–99)
HCT VFR BLD CALC: 31 % — LOW (ref 37–47)
HGB BLD-MCNC: 10.5 G/DL — LOW (ref 12–16)
MCHC RBC-ENTMCNC: 30 PG — SIGNIFICANT CHANGE UP (ref 27–31)
MCHC RBC-ENTMCNC: 33.9 G/DL — SIGNIFICANT CHANGE UP (ref 32–37)
MCV RBC AUTO: 88.6 FL — SIGNIFICANT CHANGE UP (ref 81–99)
NRBC # BLD: 0 /100 WBCS — SIGNIFICANT CHANGE UP (ref 0–0)
PLATELET # BLD AUTO: 216 K/UL — SIGNIFICANT CHANGE UP (ref 130–400)
POTASSIUM SERPL-MCNC: 3.7 MMOL/L — SIGNIFICANT CHANGE UP (ref 3.5–5)
POTASSIUM SERPL-SCNC: 3.7 MMOL/L — SIGNIFICANT CHANGE UP (ref 3.5–5)
RBC # BLD: 3.5 M/UL — LOW (ref 4.2–5.4)
RBC # FLD: 13.8 % — SIGNIFICANT CHANGE UP (ref 11.5–14.5)
SODIUM SERPL-SCNC: 144 MMOL/L — SIGNIFICANT CHANGE UP (ref 135–146)
WBC # BLD: 11.52 K/UL — HIGH (ref 4.8–10.8)
WBC # FLD AUTO: 11.52 K/UL — HIGH (ref 4.8–10.8)

## 2018-12-11 RX ORDER — AMPICILLIN SODIUM AND SULBACTAM SODIUM 250; 125 MG/ML; MG/ML
INJECTION, POWDER, FOR SUSPENSION INTRAMUSCULAR; INTRAVENOUS
Qty: 0 | Refills: 0 | Status: DISCONTINUED | OUTPATIENT
Start: 2018-12-11 | End: 2018-12-13

## 2018-12-11 RX ORDER — AMPICILLIN SODIUM AND SULBACTAM SODIUM 250; 125 MG/ML; MG/ML
3 INJECTION, POWDER, FOR SUSPENSION INTRAMUSCULAR; INTRAVENOUS EVERY 6 HOURS
Qty: 0 | Refills: 0 | Status: DISCONTINUED | OUTPATIENT
Start: 2018-12-11 | End: 2018-12-13

## 2018-12-11 RX ORDER — AMPICILLIN SODIUM AND SULBACTAM SODIUM 250; 125 MG/ML; MG/ML
3 INJECTION, POWDER, FOR SUSPENSION INTRAMUSCULAR; INTRAVENOUS ONCE
Qty: 0 | Refills: 0 | Status: COMPLETED | OUTPATIENT
Start: 2018-12-11 | End: 2018-12-11

## 2018-12-11 RX ADMIN — Medication 1 DROP(S): at 13:38

## 2018-12-11 RX ADMIN — Medication 100 MILLIGRAM(S): at 05:35

## 2018-12-11 RX ADMIN — Medication 650 MILLIGRAM(S): at 07:00

## 2018-12-11 RX ADMIN — CHLORHEXIDINE GLUCONATE 1 APPLICATION(S): 213 SOLUTION TOPICAL at 13:39

## 2018-12-11 RX ADMIN — LEVETIRACETAM 400 MILLIGRAM(S): 250 TABLET, FILM COATED ORAL at 17:52

## 2018-12-11 RX ADMIN — AMPICILLIN SODIUM AND SULBACTAM SODIUM 200 GRAM(S): 250; 125 INJECTION, POWDER, FOR SUSPENSION INTRAMUSCULAR; INTRAVENOUS at 17:52

## 2018-12-11 RX ADMIN — ENOXAPARIN SODIUM 40 MILLIGRAM(S): 100 INJECTION SUBCUTANEOUS at 13:37

## 2018-12-11 RX ADMIN — Medication 650 MILLIGRAM(S): at 05:38

## 2018-12-11 RX ADMIN — AMPICILLIN SODIUM AND SULBACTAM SODIUM 200 GRAM(S): 250; 125 INJECTION, POWDER, FOR SUSPENSION INTRAMUSCULAR; INTRAVENOUS at 11:31

## 2018-12-11 RX ADMIN — CHLORHEXIDINE GLUCONATE 15 MILLILITER(S): 213 SOLUTION TOPICAL at 05:35

## 2018-12-11 RX ADMIN — CHLORHEXIDINE GLUCONATE 15 MILLILITER(S): 213 SOLUTION TOPICAL at 17:52

## 2018-12-11 RX ADMIN — Medication 100 MILLIGRAM(S): at 13:39

## 2018-12-11 RX ADMIN — AMPICILLIN SODIUM AND SULBACTAM SODIUM 200 GRAM(S): 250; 125 INJECTION, POWDER, FOR SUSPENSION INTRAMUSCULAR; INTRAVENOUS at 23:17

## 2018-12-11 RX ADMIN — LEVETIRACETAM 400 MILLIGRAM(S): 250 TABLET, FILM COATED ORAL at 05:38

## 2018-12-11 RX ADMIN — Medication 100 MILLIGRAM(S): at 21:03

## 2018-12-11 RX ADMIN — Medication 1 DROP(S): at 05:35

## 2018-12-11 RX ADMIN — Medication 1 DROP(S): at 21:03

## 2018-12-11 RX ADMIN — PANTOPRAZOLE SODIUM 40 MILLIGRAM(S): 20 TABLET, DELAYED RELEASE ORAL at 06:52

## 2018-12-11 RX ADMIN — Medication 1 APPLICATION(S): at 17:52

## 2018-12-11 NOTE — CONSULT NOTE ADULT - ASSESSMENT
66 y/o female with PMH as above being evaluated for goal of care    Patient is s/p cardiac arrest, with repeat CtScan worse than previous ( diffuse cerebral edema). Patient is intubated, lethargic, minimal respond to painful stimuli Upper extremities only, + gag and - corneal. As per resident, family were informed of patient's overall very poor prognosis and they are considering palliatively extubating patient. No family at bedside at time of consult. Palliative team will contact family on goals of care. No symptoms to address at this time.          Recommendations:  Possible palliative extubation  Poor prognosis  lacri-lube q12hrs   DNI/DNR   we will f/u 66 y/o female with PMH as above being evaluated for goal of care    Patient is s/p cardiac arrest, with repeat CtScan worse than previous ( diffuse cerebral edema). Patient is intubated, lethargic, minimal respond to painful stimuli Upper extremities only, + gag and - corneal. No symptoms to address at this time. As per resident, family were informed of patient's overall very poor prognosis and they are considering palliatively extubating patient. No family at bedside at time of consult. Palliative Np spoke with Rhianna Rosado, who makes decision for patient. She will be coming in tomorrow and wants to know about the process involved in liberation before making final decision on goals of  care.           Recommendations:  Possible palliative extubation  Poor prognosis  adonayi-lube q12hrs   DNI/DNR   we will f/u 68 y/o female with PMH as above being evaluated for goal of care    Patient is s/p cardiac arrest, with repeat CtScan worse than previous ( diffuse cerebral edema). Patient is intubated, lethargic, minimal respond to painful stimuli Upper extremities only, + gag and - corneal. No symptoms to address at this time. As per resident, family were informed of patient's overall very poor prognosis and they are considering palliatively extubating patient. Palliative Np called Rhianna Rosado (HCP) to discuss goals of care.  She said she is aware of poor prognosis and will be coming in tomorrow. She wants to know about the process involved in liberation before making final decision on goals of  care.           Recommendations:  Possible palliative extubation  Poor prognosis  lacri-lube q12hrs   DNI/DNR   we will f/u

## 2018-12-11 NOTE — CHART NOTE - NSCHARTNOTEFT_GEN_A_CORE
________________________________________________________________________________  DAILY PROGRESS NOTE:    ================== SUBJECTIVE ==================    ANNETTE RAMIREZ  /   67y  /  Female  /  MRN#: [id number]  Patient is a 67y old Female who presents with a chief complaint of choking, acute respiratory failure, intubated  Currently admitted to medicine with the primary diagnosis of cardiac arrest      HOSPITAL DAY: 4d     ICU DAY: 4d    SUMMARY OF HOSPITAL COURSE TO DATE   66 y/o F, with PMH listed below including HTN and schizophrenia, lives at [facility], brought to the ED by EMS after cardiac arrest. On day of presentation, she was eating a bagel when she choked and collapsed. Heimlich maneuver was performed and CPR was initiated as she was pulseless. EMS arrived after around 10 mins. ROSC was established after pushing 1 dose of epi. She was intubated on site and brought to ED. S/p EEG- slowing and diffuse cerebral dysfunction. Off sedation, intubated, NG tube feeds, no pressors. Repeat CTH shows diffuse hypoxic/ischemic brain injury (diffuse loss of gray white differentiation, hypoattenuation of the deep gray nuclei, diffuse cerebral edema). Very poor prognosis. Negative cxs. Continue to monitor and will discuss advanced care with family.    OVERNIGHT EVENTS: Tm of 102, given Tylenol 650mg, fever broke after. Sinus mark this morning of 40s-50s.    TODAY: No changes in neurological status. Intubated, minimally withdraws from pain in UE, no withdrawal in LE.     =================== OBJECTIVE ===================    VITAL SIGNS: Last 24 Hours  T(C): 37.1 (11 Dec 2018 09:00), Max: 38.9 (10 Dec 2018 23:00)  T(F): 98.8 (11 Dec 2018 09:00), Max: 102 (10 Dec 2018 23:00)  HR: 46 (11 Dec 2018 09:00) (46 - 84)  BP: 113/50 (11 Dec 2018 09:00) (113/50 - 148/61)  BP(mean): 70 (11 Dec 2018 09:00) (70 - 104)  RR: 20 (11 Dec 2018 09:00) (18 - 28)  SpO2: 97% (11 Dec 2018 09:00) (94% - 97%)    12-10-18 @ 07:01  -  12-11-18 @ 07:00  --------------------------------------------------------  IN: 1260 mL / OUT: 1175 mL / NET: 85 mL    12-11-18 @ 07:01  -  12-11-18 @ 10:27  --------------------------------------------------------  IN: 40 mL / OUT: 15 mL / NET: 25 mL    PHYSICAL EXAM:  GENERAL: NAD, well-developed  HEAD:  Atraumatic, Normocephalic  EYES: PERRL, conjunctiva and sclera clear  NECK: Supple, No JVD  CHEST/LUNG: Clear to auscultation bilaterally; No wheeze  HEART: Regular rate and rhythm; No murmurs, rubs, or gallops  ABDOMEN: Soft, Nontender, Nondistended  EXTREMITIES:  2+ Peripheral Pulses, No edema  NEUROLOGY: non-focal, intubated, PERRL  SKIN: No rashes or lesions    ===================== LABS =====================                        10.5   11.52 )-----------( 216      ( 11 Dec 2018 04:16 )             31.0     12-11    144  |  107  |  17  ----------------------------<  124<H>  3.7   |  23  |  0.5<L>    Ca    8.7      11 Dec 2018 04:16  Mg     2.4     12-10          ABG - ( 11 Dec 2018 04:25 )  pH, Arterial: 7.52  pH, Blood: x     /  pCO2: 34    /  pO2: 65    / HCO3: 28    / Base Excess: 5.0   /  SaO2: 94        ================= MICROBIOLOGY ================    Culture - Blood (collected 08 Dec 2018 21:30)  Source: .Blood None  Preliminary Report (10 Dec 2018 07:01):    No growth to date.    Culture - Blood (collected 08 Dec 2018 21:30)  Source: .Blood None  Preliminary Report (10 Dec 2018 07:01):    No growth to date.      ================== IMAGING TO DATE ==================  < from: Xray Chest 1 View- PORTABLE-Routine (12.11.18 @ 06:14) >    Findings:    Support devices: Satisfactory position    Cardiac/mediastinum/hilum: Stable    Lung parenchyma/Pleura: Stable left basilar opacity/effusion. No   pneumothorax    Skeleton/soft tissues: Stable    Impression:      Stable left basilar opacity/effusions    ================== ALLERGIES ===================  No Known Allergies    ==================== MEDS =====================  ampicillin/sulbactam  IVPB      ampicillin/sulbactam  IVPB 3 Gram(s) IV Intermittent once  ampicillin/sulbactam  IVPB 3 Gram(s) IV Intermittent every 6 hours  artificial  tears Solution 1 Drop(s) Both EYES three times a day  chlorhexidine 0.12% Liquid 15 milliLiter(s) Swish and Spit two times a day  chlorhexidine 4% Liquid 1 Application(s) Topical <User Schedule>  docusate sodium Liquid 100 milliGRAM(s) Oral every 8 hours  enoxaparin Injectable 40 milliGRAM(s) SubCutaneous daily  levETIRAcetam  IVPB 1000 milliGRAM(s) IV Intermittent every 12 hours  pantoprazole   Suspension 40 milliGRAM(s) Oral before breakfast    PRN MEDICATIONS  acetaminophen   Tablet .. 650 milliGRAM(s) Oral every 6 hours PRN      ================= ASSESS/PLAN ==================  Patient is a 67y old Female who presents with a chief complaint of choking, acute respiratory failure, intubated.  Currently admitted to medicine with the primary diagnosis of cardiac arrest. Will downgrade to vent floor today.    PLAN  # Acute hypoxemic respiratory failure s/p cardiac arrest after choking while eating  On mechanical ventilation, will D/C abgs as patient is comfort care measures only  No vent changes  Off sedation- PRN for comfort care  CTH- diffuse hypoxic/ischemic brain injury (diffuse loss of gray white differentiation, hypoattenuation of the deep gray nuclei, diffuse cerebral edema).    # Anoxic Brain Injury:   EEG: generalized slowing consistent with diffuse cerebral dysfunction.  Neuro f/u: - continue Keppra 1g Q12, keep Mg > 2.0, continue sedation for now, continue supportive care. Recommending MRI if/when possible.    # Bibasilar Opacities, possible aspiration PNA: improving  CXR- Bibasilar opacities left greater than right, stable.  Continue Unasyn  WBC trending down     # Schizophrenia  Will D/C haloperidol and olanzapine as patient is actively being monitored for seizure-like activity    # Depression  D/C citalopram as patient is actively being monitored for seizure-like activity    # GERD:  C/w PPI    # Chronic tardive dyskinesia   Due to antidopaminergic meds- lip smacking tongue movements chronic as per family, unlikely due to seizures    dvt ppx- lovenox 40 q24  OG TF  code status: DNR as of 12/10/18 after discussing at length with patient's cousin, would like to continue comfort measures for now ________________________________________________________________________________  DAILY PROGRESS NOTE:    ================== SUBJECTIVE ==================    ANNETTE RAMIREZ  /   67y  /  Female  /  MRN#: [id number]  Patient is a 67y old Female who presents with a chief complaint of choking, acute respiratory failure, intubated  Currently admitted to medicine with the primary diagnosis of cardiac arrest      HOSPITAL DAY: 4d     ICU DAY: 4d    SUMMARY OF HOSPITAL COURSE TO DATE   68 y/o F, with PMH listed below including HTN and schizophrenia, lives at [facility], brought to the ED by EMS after cardiac arrest. On day of presentation, she was eating a bagel when she choked and collapsed. Heimlich maneuver was performed and CPR was initiated as she was pulseless. EMS arrived after around 10 mins. ROSC was established after pushing 1 dose of epi. She was intubated on site and brought to ED. S/p EEG- slowing and diffuse cerebral dysfunction. Off sedation, intubated, NG tube feeds, no pressors. Repeat CTH shows diffuse hypoxic/ischemic brain injury (diffuse loss of gray white differentiation, hypoattenuation of the deep gray nuclei, diffuse cerebral edema). Very poor prognosis. Negative cxs. Continue to monitor and will discuss advanced care with family.    OVERNIGHT EVENTS: Tm of 102, given Tylenol 650mg, fever broke after. Sinus mark this morning of 40s-50s.    TODAY: No changes in neurological status. Intubated, minimally withdraws from pain in UE, no withdrawal in LE.     =================== OBJECTIVE ===================    VITAL SIGNS: Last 24 Hours  T(C): 37.1 (11 Dec 2018 09:00), Max: 38.9 (10 Dec 2018 23:00)  T(F): 98.8 (11 Dec 2018 09:00), Max: 102 (10 Dec 2018 23:00)  HR: 46 (11 Dec 2018 09:00) (46 - 84)  BP: 113/50 (11 Dec 2018 09:00) (113/50 - 148/61)  BP(mean): 70 (11 Dec 2018 09:00) (70 - 104)  RR: 20 (11 Dec 2018 09:00) (18 - 28)  SpO2: 97% (11 Dec 2018 09:00) (94% - 97%)    12-10-18 @ 07:01  -  12-11-18 @ 07:00  --------------------------------------------------------  IN: 1260 mL / OUT: 1175 mL / NET: 85 mL    12-11-18 @ 07:01  -  12-11-18 @ 10:27  --------------------------------------------------------  IN: 40 mL / OUT: 15 mL / NET: 25 mL    PHYSICAL EXAM:  GENERAL: NAD, well-developed  HEAD:  Atraumatic, Normocephalic  EYES: PERRL, conjunctiva and sclera clear  NECK: Supple, No JVD  CHEST/LUNG: Clear to auscultation bilaterally; No wheeze  HEART: Regular rate and rhythm; No murmurs, rubs, or gallops  ABDOMEN: Soft, Nontender, Nondistended  EXTREMITIES:  2+ Peripheral Pulses, No edema  NEUROLOGY: non-focal, intubated, PERRL  SKIN: No rashes or lesions    ===================== LABS =====================                        10.5   11.52 )-----------( 216      ( 11 Dec 2018 04:16 )             31.0     12-11    144  |  107  |  17  ----------------------------<  124<H>  3.7   |  23  |  0.5<L>    Ca    8.7      11 Dec 2018 04:16  Mg     2.4     12-10          ABG - ( 11 Dec 2018 04:25 )  pH, Arterial: 7.52  pH, Blood: x     /  pCO2: 34    /  pO2: 65    / HCO3: 28    / Base Excess: 5.0   /  SaO2: 94        ================= MICROBIOLOGY ================    Culture - Blood (collected 08 Dec 2018 21:30)  Source: .Blood None  Preliminary Report (10 Dec 2018 07:01):    No growth to date.    Culture - Blood (collected 08 Dec 2018 21:30)  Source: .Blood None  Preliminary Report (10 Dec 2018 07:01):    No growth to date.      ================== IMAGING TO DATE ==================  < from: Xray Chest 1 View- PORTABLE-Routine (12.11.18 @ 06:14) >    Findings:    Support devices: Satisfactory position    Cardiac/mediastinum/hilum: Stable    Lung parenchyma/Pleura: Stable left basilar opacity/effusion. No   pneumothorax    Skeleton/soft tissues: Stable    Impression:      Stable left basilar opacity/effusions    ================== ALLERGIES ===================  No Known Allergies    ==================== MEDS =====================  ampicillin/sulbactam  IVPB      ampicillin/sulbactam  IVPB 3 Gram(s) IV Intermittent once  ampicillin/sulbactam  IVPB 3 Gram(s) IV Intermittent every 6 hours  artificial  tears Solution 1 Drop(s) Both EYES three times a day  chlorhexidine 0.12% Liquid 15 milliLiter(s) Swish and Spit two times a day  chlorhexidine 4% Liquid 1 Application(s) Topical <User Schedule>  docusate sodium Liquid 100 milliGRAM(s) Oral every 8 hours  enoxaparin Injectable 40 milliGRAM(s) SubCutaneous daily  levETIRAcetam  IVPB 1000 milliGRAM(s) IV Intermittent every 12 hours  pantoprazole   Suspension 40 milliGRAM(s) Oral before breakfast    PRN MEDICATIONS  acetaminophen   Tablet .. 650 milliGRAM(s) Oral every 6 hours PRN      ================= ASSESS/PLAN ==================  Patient is a 67y old Female who presents with a chief complaint of choking, acute respiratory failure, intubated.  Currently admitted to medicine with the primary diagnosis of cardiac arrest. Will downgrade to vent floor today.    PLAN  # Acute hypoxemic respiratory failure s/p cardiac arrest after choking while eating  On mechanical ventilation, will D/C abgs as patient is comfort care measures only  No vent changes  Off sedation- PRN for comfort care  CTH- diffuse hypoxic/ischemic brain injury (diffuse loss of gray white differentiation, hypoattenuation of the deep gray nuclei, diffuse cerebral edema).    # Anoxic Brain Injury:   EEG: generalized slowing consistent with diffuse cerebral dysfunction.  Neuro f/u: - continue Keppra 1g Q12, keep Mg > 2.0, continue sedation for now, continue supportive care. Recommending MRI if/when possible.    # Bibasilar Opacities, possible aspiration PNA: improving  CXR- Bibasilar opacities left greater than right, stable.  Continue Unasyn  WBC trending down     # Schizophrenia  Will D/C haloperidol and olanzapine as patient is actively being monitored for seizure-like activity    # Depression  D/C citalopram as patient is actively being monitored for seizure-like activity    # GERD:  C/w PPI    # Chronic tardive dyskinesia   Due to antidopaminergic meds- lip smacking tongue movements chronic as per family, unlikely due to seizures    dvt ppx- lovenox 40 q24  OG TF  code status: DNR as of 12/10/18 after discussing at length with patient's cousin, would like to continue comfort measures for now. Patient's cousin is Ms. Rhianna Rosado- she is directing all aspects of her care . Phone number ________________________________________________________________________________  DAILY PROGRESS NOTE:    ================== SUBJECTIVE ==================    ANNETTE RAMIREZ  /   67y  /  Female  /  MRN#: [id number]  Patient is a 67y old Female who presents with cardiac arrest s/p choking while eating, acute respiratory failure, intubated  Currently admitted to medicine with the primary diagnosis of cardiac arrest    HOSPITAL DAY: 4d     ICU DAY: 4d    OVERNIGHT EVENTS: Tm of 102, given Tylenol 650mg, fever broke after. Sinus mark this morning of 40s-50s.    TODAY: No changes in neurological status. Intubated, minimally withdraws from pain in UE, no withdrawal from pain in LE. Sedation PRN for comfort care measures. C/w OG TFs.    =================== OBJECTIVE ===================    VITAL SIGNS: Last 24 Hours  T(C): 37.1 (11 Dec 2018 09:00), Max: 38.9 (10 Dec 2018 23:00)  T(F): 98.8 (11 Dec 2018 09:00), Max: 102 (10 Dec 2018 23:00)  HR: 46 (11 Dec 2018 09:00) (46 - 84)  BP: 113/50 (11 Dec 2018 09:00) (113/50 - 148/61)  BP(mean): 70 (11 Dec 2018 09:00) (70 - 104)  RR: 20 (11 Dec 2018 09:00) (18 - 28)  SpO2: 97% (11 Dec 2018 09:00) (94% - 97%)    12-10-18 @ 07:01  -  12-11-18 @ 07:00  --------------------------------------------------------  IN: 1260 mL / OUT: 1175 mL / NET: 85 mL    12-11-18 @ 07:01  -  12-11-18 @ 10:27  --------------------------------------------------------  IN: 40 mL / OUT: 15 mL / NET: 25 mL    PHYSICAL EXAM:  GENERAL: NAD  HEAD:  Atraumatic, Normocephalic  EYES: PERRL, conjunctiva and sclera clear  NECK: Supple, No JVD  CHEST/LUNG: Clear to auscultation bilaterally; No wheeze  HEART: Sinus mark, normal rhythm; No murmurs, rubs, or gallops  ABDOMEN: Soft, Nontender, Nondistended  EXTREMITIES:  2+ Peripheral Pulses, No edema  NEUROLOGY: non-focal, intubated, PERRL  SKIN: No rashes or lesions    ===================== LABS =====================                        10.5   11.52 )-----------( 216      ( 11 Dec 2018 04:16 )             31.0     12-11    144  |  107  |  17  ----------------------------<  124<H>  3.7   |  23  |  0.5<L>    Ca    8.7      11 Dec 2018 04:16  Mg     2.4     12-10    ABG - ( 11 Dec 2018 04:25 )  pH, Arterial: 7.52  pH, Blood: x     /  pCO2: 34    /  pO2: 65    / HCO3: 28    / Base Excess: 5.0   /  SaO2: 94        ================= MICROBIOLOGY ================    Culture - Blood (collected 08 Dec 2018 21:30)  Source: .Blood None  Preliminary Report (10 Dec 2018 07:01):    No growth to date.    Culture - Blood (collected 08 Dec 2018 21:30)  Source: .Blood None  Preliminary Report (10 Dec 2018 07:01):    No growth to date.    ================== IMAGING TO DATE ==================  < from: Xray Chest 1 View- PORTABLE-Routine (12.11.18 @ 06:14) >    Findings:    Support devices: Satisfactory position    Cardiac/mediastinum/hilum: Stable    Lung parenchyma/Pleura: Stable left basilar opacity/effusion. No   pneumothorax    Skeleton/soft tissues: Stable    Impression:      Stable left basilar opacity/effusions    ================== ALLERGIES ===================  No Known Allergies    ==================== MEDS =====================  ampicillin/sulbactam  IVPB      ampicillin/sulbactam  IVPB 3 Gram(s) IV Intermittent once  ampicillin/sulbactam  IVPB 3 Gram(s) IV Intermittent every 6 hours  artificial  tears Solution 1 Drop(s) Both EYES three times a day  chlorhexidine 0.12% Liquid 15 milliLiter(s) Swish and Spit two times a day  chlorhexidine 4% Liquid 1 Application(s) Topical <User Schedule>  docusate sodium Liquid 100 milliGRAM(s) Oral every 8 hours  enoxaparin Injectable 40 milliGRAM(s) SubCutaneous daily  levETIRAcetam  IVPB 1000 milliGRAM(s) IV Intermittent every 12 hours  pantoprazole   Suspension 40 milliGRAM(s) Oral before breakfast    PRN MEDICATIONS  acetaminophen   Tablet .. 650 milliGRAM(s) Oral every 6 hours PRN    ================= ASSESS/PLAN ==================  PLAN  # Acute hypoxemic respiratory failure s/p cardiac arrest after choking while eating  On mechanical ventilation, will D/C abgs as patient is comfort care measures only  No vent changes or serial CXRs  CTH- diffuse hypoxic/ischemic brain injury (diffuse loss of gray white differentiation, hypoattenuation of the deep gray nuclei, diffuse cerebral edema).    # Anoxic Brain Injury:   EEG: generalized slowing consistent with diffuse cerebral dysfunction.  Neuro f/u: - continue Keppra 1g Q12, keep Mg > 2.0, PRN sedation for comfort care/measures.    # Bibasilar Opacities, possible aspiration PNA: improving  CXR- Bibasilar opacities left greater than right, stable.  Continue Unasyn  No additional labs will be done    # Schizophrenia  Will D/C haloperidol and olanzapine as patient is actively being monitored for seizure-like activity    # Depression  D/C citalopram as patient is actively being monitored for seizure-like activity    # GERD:  C/w PPI    # Chronic tardive dyskinesia   Due to antidopaminergic meds- lip smacking tongue movements chronic as per family, unlikely due to seizures    DVT ppx- lovenox 40 q24  OG TF  code status: DNR as of 12/10/18 after discussing at length with patient's cousin, would like to continue comfort measures for now. Patient's cousin is Ms. Rhianna Rosado- she is directing all aspects of her care . Phone number    ICU DOWNGRADE/COURSE OF EVENTS:  -------------------------------------------------------------------------------------------  68 y/o F, with PMH listed below including HTN and schizophrenia, lives at [facility], brought to the ED by EMS after cardiac arrest. On day of presentation, she was eating a bagel when she choked and collapsed. Heimlich maneuver was performed and CPR was initiated as she was pulseless. EMS arrived after around 10 mins. ROSC was established after pushing 1 dose of epi. She was intubated on site and brought to ED. S/p EEG- slowing and diffuse cerebral dysfunction. Off sedation, intubated, NG tube feeds, no pressors. Repeat CTH shows diffuse hypoxic/ischemic brain injury (diffuse loss of gray white differentiation, hypoattenuation of the deep gray nuclei, diffuse cerebral edema). Very poor prognosis. Negative cxs. Comfort care measures per patient's cousin, Ms. Rhianna Rosado, directing all aspects of care. Patient's cousin is Ms. Rhianna Rosado- she is directing all aspects of her care . Phone number        -------------------------------------------------------------------------------------------      SIGN OUT AT 12-11-18 GIVEN TO Dr. Campa by Dr. Ricardo. Will transfer to [facility] unit. ________________________________________________________________________________  DAILY PROGRESS NOTE:    ================== SUBJECTIVE ==================    ANNETTE RAMIREZ  /   67y  /  Female  /  MRN#: [id number]  Patient is a 67y old Female who presents with cardiac arrest s/p choking while eating, acute respiratory failure, intubated  Currently admitted to medicine with the primary diagnosis of cardiac arrest    HOSPITAL DAY: 4d     ICU DAY: 4d    OVERNIGHT EVENTS: Tm of 102, given Tylenol 650mg, fever broke after. Sinus mark this morning of 40s-50s.    TODAY: No changes in neurological status. Intubated, minimally withdraws from pain in UE, no withdrawal from pain in LE. Sedation PRN for comfort care measures. C/w OG TFs.    =================== OBJECTIVE ===================    VITAL SIGNS: Last 24 Hours  T(C): 37.1 (11 Dec 2018 09:00), Max: 38.9 (10 Dec 2018 23:00)  T(F): 98.8 (11 Dec 2018 09:00), Max: 102 (10 Dec 2018 23:00)  HR: 46 (11 Dec 2018 09:00) (46 - 84)  BP: 113/50 (11 Dec 2018 09:00) (113/50 - 148/61)  BP(mean): 70 (11 Dec 2018 09:00) (70 - 104)  RR: 20 (11 Dec 2018 09:00) (18 - 28)  SpO2: 97% (11 Dec 2018 09:00) (94% - 97%)    12-10-18 @ 07:01  -  12-11-18 @ 07:00  --------------------------------------------------------  IN: 1260 mL / OUT: 1175 mL / NET: 85 mL    12-11-18 @ 07:01  -  12-11-18 @ 10:27  --------------------------------------------------------  IN: 40 mL / OUT: 15 mL / NET: 25 mL    PHYSICAL EXAM:  GENERAL: NAD  HEAD:  Atraumatic, Normocephalic  EYES: PERRL, conjunctiva and sclera clear  NECK: Supple, No JVD  CHEST/LUNG: Clear to auscultation bilaterally; No wheeze  HEART: Sinus mark, normal rhythm; No murmurs, rubs, or gallops  ABDOMEN: Soft, Nontender, Nondistended  EXTREMITIES:  2+ Peripheral Pulses, No edema  NEUROLOGY: non-focal, intubated, PERRL  SKIN: No rashes or lesions    ===================== LABS =====================                        10.5   11.52 )-----------( 216      ( 11 Dec 2018 04:16 )             31.0     12-11    144  |  107  |  17  ----------------------------<  124<H>  3.7   |  23  |  0.5<L>    Ca    8.7      11 Dec 2018 04:16  Mg     2.4     12-10    ABG - ( 11 Dec 2018 04:25 )  pH, Arterial: 7.52  pH, Blood: x     /  pCO2: 34    /  pO2: 65    / HCO3: 28    / Base Excess: 5.0   /  SaO2: 94        ================= MICROBIOLOGY ================    Culture - Blood (collected 08 Dec 2018 21:30)  Source: .Blood None  Preliminary Report (10 Dec 2018 07:01):    No growth to date.    Culture - Blood (collected 08 Dec 2018 21:30)  Source: .Blood None  Preliminary Report (10 Dec 2018 07:01):    No growth to date.    ================== IMAGING TO DATE ==================  < from: Xray Chest 1 View- PORTABLE-Routine (12.11.18 @ 06:14) >    Findings:    Support devices: Satisfactory position    Cardiac/mediastinum/hilum: Stable    Lung parenchyma/Pleura: Stable left basilar opacity/effusion. No   pneumothorax    Skeleton/soft tissues: Stable    Impression:      Stable left basilar opacity/effusions    ================== ALLERGIES ===================  No Known Allergies    ==================== MEDS =====================  ampicillin/sulbactam  IVPB      ampicillin/sulbactam  IVPB 3 Gram(s) IV Intermittent once  ampicillin/sulbactam  IVPB 3 Gram(s) IV Intermittent every 6 hours  artificial  tears Solution 1 Drop(s) Both EYES three times a day  chlorhexidine 0.12% Liquid 15 milliLiter(s) Swish and Spit two times a day  chlorhexidine 4% Liquid 1 Application(s) Topical <User Schedule>  docusate sodium Liquid 100 milliGRAM(s) Oral every 8 hours  enoxaparin Injectable 40 milliGRAM(s) SubCutaneous daily  levETIRAcetam  IVPB 1000 milliGRAM(s) IV Intermittent every 12 hours  pantoprazole   Suspension 40 milliGRAM(s) Oral before breakfast    PRN MEDICATIONS  acetaminophen   Tablet .. 650 milliGRAM(s) Oral every 6 hours PRN    ================= ASSESS/PLAN ==================  PLAN  # Acute hypoxemic respiratory failure s/p cardiac arrest after choking while eating  On mechanical ventilation, will D/C abgs as patient is comfort care measures only  No vent changes or serial CXRs  CTH- diffuse hypoxic/ischemic brain injury (diffuse loss of gray white differentiation, hypoattenuation of the deep gray nuclei, diffuse cerebral edema).    # Anoxic Brain Injury:   EEG: generalized slowing consistent with diffuse cerebral dysfunction.  Neuro f/u: - continue Keppra 1g Q12, keep Mg > 2.0, PRN sedation for comfort care/measures.    # Bibasilar Opacities, possible aspiration PNA: improving  CXR- Bibasilar opacities left greater than right, stable.  Continue Unasyn  No additional labs will be done    # Schizophrenia  Will D/C haloperidol and olanzapine as patient is actively being monitored for seizure-like activity    # Depression  D/C citalopram as patient is actively being monitored for seizure-like activity    # GERD:  C/w PPI    # Chronic tardive dyskinesia   Due to antidopaminergic meds- lip smacking tongue movements chronic as per family, unlikely due to seizures    DVT ppx- lovenox 40 q24  NG TF  code status: DNR as of 12/10/18 after discussing at length with patient's cousin, would like to continue comfort measures for now. Patient's cousin is Ms. Rhianna Rosado- she is directing all aspects of her care . Phone number    ICU DOWNGRADE/COURSE OF EVENTS:  -------------------------------------------------------------------------------------------  66 y/o F, with PMH of HTN and schizophrenia, lives at [facility], brought to the ED by EMS after cardiac arrest while eating a bagel and choking. Heimlich maneuver was performed and CPR was initiated as she was pulseless. EMS arrived after around 10 mins. ROSC was established after pushing 1 dose of epi. She was intubated on site and brought to ED. Neurology was consulted during ICU stay. S/p EEG- slowing and diffuse cerebral dysfunction. Off sedation, intubated, NG tube feeds, no pressors. Repeat CTH shows diffuse hypoxic/ischemic brain injury (diffuse loss of gray white differentiation, hypoattenuation of the deep gray nuclei, diffuse cerebral edema). Very poor prognosis. Negative cxs. Comfort care measures per patient's cousin, Ms. Rhianna Rosado, directing all aspects of care. Patient's cousin is Ms. Rhianna Rosado- she is directing all aspects of her care. Phone number [phone]. DNR signed.  -------------------------------------------------------------------------------------------  SIGN OUT AT 12-11-18 GIVEN TO Dr. Campa by Dr. Ricardo. Will transfer to Critical access hospital unit. DAILY PROGRESS NOTE:    ================== SUBJECTIVE ==================    ANNETTE RAMIREZ  /   67y  /  Female  /  MRN#: [id number]  Patient is a 67y old Female who presents with cardiac arrest s/p choking while eating, acute respiratory failure, intubated  Currently admitted to medicine with the primary diagnosis of cardiac arrest    HOSPITAL DAY: 4d     ICU DAY: 4d    OVERNIGHT EVENTS: Tm of 102, given Tylenol 650mg, fever broke after. Sinus mark this morning of 40s-50s.    TODAY: No changes in neurological status. Intubated, minimally withdraws from pain in UE, no withdrawal from pain in LE. Sedation PRN for comfort care measures. C/w NG TFs.    =================== OBJECTIVE ===================  VITAL SIGNS: Last 24 Hours  T(C): 37.1 (11 Dec 2018 09:00), Max: 38.9 (10 Dec 2018 23:00)  T(F): 98.8 (11 Dec 2018 09:00), Max: 102 (10 Dec 2018 23:00)  HR: 46 (11 Dec 2018 09:00) (46 - 84)  BP: 113/50 (11 Dec 2018 09:00) (113/50 - 148/61)  BP(mean): 70 (11 Dec 2018 09:00) (70 - 104)  RR: 20 (11 Dec 2018 09:00) (18 - 28)  SpO2: 97% (11 Dec 2018 09:00) (94% - 97%)    12-10-18 @ 07:01  -  12-11-18 @ 07:00  --------------------------------------------------------  IN: 1260 mL / OUT: 1175 mL / NET: 85 mL    12-11-18 @ 07:01  -  12-11-18 @ 10:27  --------------------------------------------------------  IN: 40 mL / OUT: 15 mL / NET: 25 mL    PHYSICAL EXAM:  GENERAL: NAD  HEAD:  Atraumatic, Normocephalic  EYES: PERRL, conjunctiva and sclera clear  NECK: Supple, No JVD  CHEST/LUNG: Clear to auscultation bilaterally; No wheeze  HEART: Sinus mark, normal rhythm; No murmurs, rubs, or gallops  ABDOMEN: Soft, Nontender, Nondistended  EXTREMITIES:  2+ Peripheral Pulses, No edema  NEUROLOGY: non-focal, intubated, PERRL  SKIN: No rashes or lesions    ===================== LABS =====================                        10.5   11.52 )-----------( 216      ( 11 Dec 2018 04:16 )             31.0     12-11    144  |  107  |  17  ----------------------------<  124<H>  3.7   |  23  |  0.5<L>    Ca    8.7      11 Dec 2018 04:16  Mg     2.4     12-10    ABG - ( 11 Dec 2018 04:25 )  pH, Arterial: 7.52  pH, Blood: x     /  pCO2: 34    /  pO2: 65    / HCO3: 28    / Base Excess: 5.0   /  SaO2: 94        ================= MICROBIOLOGY ================    Culture - Blood (collected 08 Dec 2018 21:30)  Source: .Blood None  Preliminary Report (10 Dec 2018 07:01):    No growth to date.    Culture - Blood (collected 08 Dec 2018 21:30)  Source: .Blood None  Preliminary Report (10 Dec 2018 07:01):    No growth to date.    ================== IMAGING TO DATE ==================  < from: Xray Chest 1 View- PORTABLE-Routine (12.11.18 @ 06:14) >    Findings:    Support devices: Satisfactory position    Cardiac/mediastinum/hilum: Stable    Lung parenchyma/Pleura: Stable left basilar opacity/effusion. No   pneumothorax    Skeleton/soft tissues: Stable    Impression:      Stable left basilar opacity/effusions    ================== ALLERGIES ===================  No Known Allergies    ==================== MEDS =====================  ampicillin/sulbactam  IVPB      ampicillin/sulbactam  IVPB 3 Gram(s) IV Intermittent once  ampicillin/sulbactam  IVPB 3 Gram(s) IV Intermittent every 6 hours  artificial  tears Solution 1 Drop(s) Both EYES three times a day  chlorhexidine 0.12% Liquid 15 milliLiter(s) Swish and Spit two times a day  chlorhexidine 4% Liquid 1 Application(s) Topical <User Schedule>  docusate sodium Liquid 100 milliGRAM(s) Oral every 8 hours  enoxaparin Injectable 40 milliGRAM(s) SubCutaneous daily  levETIRAcetam  IVPB 1000 milliGRAM(s) IV Intermittent every 12 hours  pantoprazole   Suspension 40 milliGRAM(s) Oral before breakfast    PRN MEDICATIONS  acetaminophen   Tablet .. 650 milliGRAM(s) Oral every 6 hours PRN    ================= ASSESS/PLAN ==================  PLAN  # Acute hypoxemic respiratory failure s/p cardiac arrest after choking while eating  On mechanical ventilation, will D/C abgs as patient is comfort care measures only  No vent changes or serial CXRs  CTH- diffuse hypoxic/ischemic brain injury (diffuse loss of gray white differentiation, hypoattenuation of the deep gray nuclei, diffuse cerebral edema).    # Anoxic Brain Injury:   EEG: generalized slowing consistent with diffuse cerebral dysfunction.  Neuro f/u: - continue Keppra 1g Q12, keep Mg > 2.0, PRN sedation for comfort care/measures.    # Bibasilar Opacities, possible aspiration PNA: improving  CXR- Bibasilar opacities left greater than right, stable.  Continue Unasyn, no additional labs will be done    # Schizophrenia  D/C'd haloperidol and olanzapine as patient is actively being monitored for seizure-like activity    # Depression  D/C'd citalopram as patient is actively being monitored for seizure-like activity    # GERD:  C/w PPI    # Chronic tardive dyskinesia   Due to antidopaminergic meds- lip smacking tongue movements chronic as per family, unlikely due to seizures    DVT ppx- lovenox 40 q24  NG TF  Code status: DNR as of 12/10/18 after discussing at length with patient's cousin, would like to continue comfort measures for now. Patient's cousin is Ms. Rhianna Rosado- she is directing all aspects of her care. Patient has no other family members.    ICU DOWNGRADE/COURSE OF EVENTS:  -------------------------------------------------------------------------------------------  66 y/o F, with PMH of HTN and schizophrenia, lives at [facility], brought to the ED by EMS after cardiac arrest while eating a bagel and choking. Heimlich maneuver was performed and CPR was initiated as she was pulseless. EMS arrived after around 10 mins. ROSC was established after pushing 1 dose of epi. She was intubated on site and brought to ED. Neurology was consulted during ICU stay. S/p EEG- slowing and diffuse cerebral dysfunction. Off sedation, intubated, NG tube feeds, no pressors. Repeat CTH shows diffuse hypoxic/ischemic brain injury (diffuse loss of gray white differentiation, hypoattenuation of the deep gray nuclei, diffuse cerebral edema). Very poor prognosis. Negative cxs. Patient's cousin is Ms. Rhianna Rosado- she is directing all aspects of her care. Comfort care measures only. Phone number [phone]. DNR signed.  -------------------------------------------------------------------------------------------  SIGN OUT AT 12-11-18 GIVEN TO Dr. Campa by Dr. Ricardo. Will transfer to vent unit.

## 2018-12-11 NOTE — PROGRESS NOTE ADULT - ASSESSMENT
IMPRESSION:    Acute hypoxemic respiratory failure  SP Code Blue and Aspiration  Anoxic brain injury   HO schizophrenia    PLAN:    CNS: PRN sedation for comfort if needed    HEENT: Oral care    PULMONARY:  HOB @ 45 degrees.  No vent changes.  No ABG     CARDIOVASCULAR: i=O    GI: GI prophylaxis.  OG Feeding     RENAL:  Follow up lytes.  Correct as needed    INFECTIOUS DISEASE: Follow up cultures.  Continue ABX     HEMATOLOGICAL:  DVT prophylaxis.    ENDOCRINE:  Follow up FS.     MUSCULOSKELETAL:    Overall prognosis poor     Comfort care per family request    DC Rosario     Transfer to Vent Unit

## 2018-12-11 NOTE — PROGRESS NOTE ADULT - SUBJECTIVE AND OBJECTIVE BOX
Patient is a 67y old  Female who presents with a chief complaint of choking, acute respiratory failure, intubated (10 Dec 2018 13:49)        Over Night Events: Remains on MV.  Off pressors.  Off sedation.  Family wishes comfort care         ROS:  See HPI    PHYSICAL EXAM    ICU Vital Signs Last 24 Hrs  T(C): 37.2 (11 Dec 2018 08:00), Max: 38.9 (10 Dec 2018 23:00)  T(F): 99 (11 Dec 2018 08:00), Max: 102 (10 Dec 2018 23:00)  HR: 46 (11 Dec 2018 09:00) (46 - 84)  BP: 113/50 (11 Dec 2018 09:00) (113/50 - 148/61)  BP(mean): 70 (11 Dec 2018 09:00) (70 - 104)  ABP: --  ABP(mean): --  RR: 20 (11 Dec 2018 09:00) (18 - 28)  SpO2: 97% (11 Dec 2018 09:00) (94% - 97%)      General: In NAD   HEENT: + ET             Lymphatic system: No cervical LN   Lungs: Bilateral BS  Cardiovascular: Regular   Gastrointestinal: Soft, Positive BS  Extremities: No clubbing.  Full Range of motion   Skin: Warm, intact  Neurological: Withdraws UE to pain       12-10-18 @ 07:01  -  12-11-18 @ 07:00  --------------------------------------------------------  IN:    IV PiggyBack: 300 mL    Osmolite: 960 mL  Total IN: 1260 mL    OUT:    Indwelling Catheter - Urethral: 1175 mL  Total OUT: 1175 mL    Total NET: 85 mL      12-11-18 @ 07:01  -  12-11-18 @ 09:09  --------------------------------------------------------  IN:    Osmolite: 40 mL  Total IN: 40 mL    OUT:    Indwelling Catheter - Urethral: 15 mL  Total OUT: 15 mL    Total NET: 25 mL          LABS:                            10.5   11.52 )-----------( 216      ( 11 Dec 2018 04:16 )             31.0                                               12-11    144  |  107  |  17  ----------------------------<  124<H>  3.7   |  23  |  0.5<L>    Ca    8.7      11 Dec 2018 04:16  Mg     2.4     12-10                                                                                                                                      Culture - Blood (collected 08 Dec 2018 21:30)  Source: .Blood None  Preliminary Report (10 Dec 2018 07:01):    No growth to date.    Culture - Blood (collected 08 Dec 2018 21:30)  Source: .Blood None  Preliminary Report (10 Dec 2018 07:01):    No growth to date.                                                   Mode: AC/ CMV (Assist Control/ Continuous Mandatory Ventilation)  RR (machine): 14  TV (machine): 400  FiO2: 30  PEEP: 5  ITime: 1  MAP: 10  PIP: 20                                      ABG - ( 11 Dec 2018 04:25 )  pH, Arterial: 7.52  pH, Blood: x     /  pCO2: 34    /  pO2: 65    / HCO3: 28    / Base Excess: 5.0   /  SaO2: 94                  MEDICATIONS  (STANDING):  artificial  tears Solution 1 Drop(s) Both EYES three times a day  chlorhexidine 0.12% Liquid 15 milliLiter(s) Swish and Spit two times a day  chlorhexidine 4% Liquid 1 Application(s) Topical <User Schedule>  cholecalciferol 1000 Unit(s) Oral daily  docusate sodium Liquid 100 milliGRAM(s) Oral every 8 hours  enoxaparin Injectable 40 milliGRAM(s) SubCutaneous daily  levETIRAcetam  IVPB 1000 milliGRAM(s) IV Intermittent every 12 hours  pantoprazole   Suspension 40 milliGRAM(s) Oral before breakfast  simvastatin 40 milliGRAM(s) Oral at bedtime    MEDICATIONS  (PRN):  acetaminophen   Tablet .. 650 milliGRAM(s) Oral every 6 hours PRN Temp greater or equal to 38C (100.4F), Moderate Pain (4 - 6)      Xrays:                                                                                     ECHO

## 2018-12-11 NOTE — PROGRESS NOTE ADULT - SUBJECTIVE AND OBJECTIVE BOX
Neurology Consult    Patient is a 67y old  Female who presents with a chief complaint of choking, acute respiratory failure, intubated (11 Dec 2018 09:08)      HPI:  68 y/o F, with PMH listed below including HTN and schizophrenia, lives at [facility], brought to the ED by EMS after cardiac arrest. On day of presentation, she was eating a bagel when she choked and collapsed. Heimlich maneuver was performed and CPR was initiated as she was pulseless. EMS arrived after around 10 mins. ROSC was established after pushing 1 dose of epi. She was intubated on site and brought to ED.   spoke to patients cousin's wife (cousin is HCP), patient is a chronic smoker 1ppd for many years. (07 Dec 2018 14:30)    Interval History: Unable to interview patient due to lethargic state.  Does not wake to verbal, tactile or painful stimuli.      PAST MEDICAL & SURGICAL HISTORY:  Depressed  High cholesterol  Tardive dyskinesia  Schizophrenia  GERD (gastroesophageal reflux disease)  No significant past surgical history      FAMILY HISTORY:  Family history of hypertension (Father)      Social History: (-) x 3    Allergies    No Known Allergies    Intolerances        MEDICATIONS  (STANDING):  ampicillin/sulbactam  IVPB      ampicillin/sulbactam  IVPB 3 Gram(s) IV Intermittent once  ampicillin/sulbactam  IVPB 3 Gram(s) IV Intermittent every 6 hours  artificial  tears Solution 1 Drop(s) Both EYES three times a day  chlorhexidine 0.12% Liquid 15 milliLiter(s) Swish and Spit two times a day  chlorhexidine 4% Liquid 1 Application(s) Topical <User Schedule>  docusate sodium Liquid 100 milliGRAM(s) Oral every 8 hours  enoxaparin Injectable 40 milliGRAM(s) SubCutaneous daily  levETIRAcetam  IVPB 1000 milliGRAM(s) IV Intermittent every 12 hours  pantoprazole   Suspension 40 milliGRAM(s) Oral before breakfast    MEDICATIONS  (PRN):  acetaminophen   Tablet .. 650 milliGRAM(s) Oral every 6 hours PRN Temp greater or equal to 38C (100.4F), Moderate Pain (4 - 6)      Review of systems:    Constitutional: No fever, weight loss or fatigue    Eyes: No eye pain or discharge  ENMT:  No difficulty hearing; No sinus or throat pain  Neck: No pain or stiffness  Respiratory: No cough, wheezing, chills or hemoptysis  Cardiovascular: No chest pain, palpitations, shortness of breath, dyspnea on exertion  Gastrointestinal: No abdominal pain, nausea, vomiting or hematemesis; No diarrhea or constipation.   Genitourinary: No dysuria, frequency, hematuria or incontinence  Neurological: As per HPI  Skin: No rashes or lesions   Endocrine: No heat or cold intolerance; No hair loss  Musculoskeletal: No joint pain or swelling  Psychiatric: No depression, anxiety, mood swings  Heme/Lymph: No easy bruising or bleeding gums    Vital Signs Last 24 Hrs  T(C): 37.1 (11 Dec 2018 09:00), Max: 38.9 (10 Dec 2018 23:00)  T(F): 98.8 (11 Dec 2018 09:00), Max: 102 (10 Dec 2018 23:00)  HR: 46 (11 Dec 2018 09:00) (46 - 84)  BP: 113/50 (11 Dec 2018 09:00) (113/50 - 148/61)  BP(mean): 70 (11 Dec 2018 09:00) (70 - 104)  RR: 20 (11 Dec 2018 09:00) (18 - 28)  SpO2: 97% (11 Dec 2018 09:00) (94% - 97%)    Neurologic Examination:    General: The patient is lethargic and does not open eyes to verbal, tactile or painful stimuli. improved head control since yesterday.   Unable to assess VA, VFF.  EOM as patient does not open eyes.    Unable to assess motor/sensory function, strenghth or cerebellar function given lethargic state  0+ reflexes    Labs:   CBC Full  -  ( 11 Dec 2018 04:16 )  WBC Count : 11.52 K/uL  Hemoglobin : 10.5 g/dL  Hematocrit : 31.0 %  Platelet Count - Automated : 216 K/uL  Mean Cell Volume : 88.6 fL  Mean Cell Hemoglobin : 30.0 pg  Mean Cell Hemoglobin Concentration : 33.9 g/dL  Auto Neutrophil # : x  Auto Lymphocyte # : x  Auto Monocyte # : x  Auto Eosinophil # : x  Auto Basophil # : x  Auto Neutrophil % : x  Auto Lymphocyte % : x  Auto Monocyte % : x  Auto Eosinophil % : x  Auto Basophil % : x    12-11    144  |  107  |  17  ----------------------------<  124<H>  3.7   |  23  |  0.5<L>    Ca    8.7      11 Dec 2018 04:16  Mg     2.4     12-10                Neuroimaging:  NCHCT:     IMPRESSION:    Interval development of findings consistent with diffuse hypoxic/ischemic   brain injury (diffuse loss of gray white differentiation, hypoattenuation   of the deep gray nuclei, diffuse cerebral edema).      VEEG: < from: EEG Monitoring Each 24 hours (12.09.18 @ 10:40) >  Impression  This is an abnormal Video EEG study with generalized slowing consistent   with diffuse cerebral dysfunction. There is no epileptiform activity   noted in the study.    < end of copied text >      Assessment:  This is a 67y Female with h/o diffuse hypoxic/ischemic brain injury w/ generalized slowing on EEG.  Unchanged neurological exam and no improvement in mental state since discontinuation of sedation.     Plan:   - MRI brain when possible     attending note to follow     12-11-18 @ 10:23 Neurology Consult    Patient is a 67y old  Female who presents with a chief complaint of choking, acute respiratory failure, intubated (11 Dec 2018 09:08)      HPI:  66 y/o F, with PMH listed below including HTN and schizophrenia, lives at [facility], brought to the ED by EMS after cardiac arrest. On day of presentation, she was eating a bagel when she choked and collapsed. Heimlich maneuver was performed and CPR was initiated as she was pulseless. EMS arrived after around 10 mins. ROSC was established after pushing 1 dose of epi. She was intubated on site and brought to ED.   spoke to patients cousin's wife (cousin is HCP), patient is a chronic smoker 1ppd for many years. (07 Dec 2018 14:30)    Interval History: Unable to interview patient due to lethargic state.  Does not wake to verbal, tactile or painful stimuli.      PAST MEDICAL & SURGICAL HISTORY:  Depressed  High cholesterol  Tardive dyskinesia  Schizophrenia  GERD (gastroesophageal reflux disease)  No significant past surgical history      FAMILY HISTORY:  Family history of hypertension (Father)      Social History: (-) x 3    Allergies    No Known Allergies    Intolerances        MEDICATIONS  (STANDING):  ampicillin/sulbactam  IVPB      ampicillin/sulbactam  IVPB 3 Gram(s) IV Intermittent once  ampicillin/sulbactam  IVPB 3 Gram(s) IV Intermittent every 6 hours  artificial  tears Solution 1 Drop(s) Both EYES three times a day  chlorhexidine 0.12% Liquid 15 milliLiter(s) Swish and Spit two times a day  chlorhexidine 4% Liquid 1 Application(s) Topical <User Schedule>  docusate sodium Liquid 100 milliGRAM(s) Oral every 8 hours  enoxaparin Injectable 40 milliGRAM(s) SubCutaneous daily  levETIRAcetam  IVPB 1000 milliGRAM(s) IV Intermittent every 12 hours  pantoprazole   Suspension 40 milliGRAM(s) Oral before breakfast    MEDICATIONS  (PRN):  acetaminophen   Tablet .. 650 milliGRAM(s) Oral every 6 hours PRN Temp greater or equal to 38C (100.4F), Moderate Pain (4 - 6)      Review of systems:    Constitutional: No fever, weight loss or fatigue    Eyes: No eye pain or discharge  ENMT:  No difficulty hearing; No sinus or throat pain  Neck: No pain or stiffness  Respiratory: No cough, wheezing, chills or hemoptysis  Cardiovascular: No chest pain, palpitations, shortness of breath, dyspnea on exertion  Gastrointestinal: No abdominal pain, nausea, vomiting or hematemesis; No diarrhea or constipation.   Genitourinary: No dysuria, frequency, hematuria or incontinence  Neurological: As per HPI  Skin: No rashes or lesions   Endocrine: No heat or cold intolerance; No hair loss  Musculoskeletal: No joint pain or swelling  Psychiatric: No depression, anxiety, mood swings  Heme/Lymph: No easy bruising or bleeding gums    Vital Signs Last 24 Hrs  T(C): 37.1 (11 Dec 2018 09:00), Max: 38.9 (10 Dec 2018 23:00)  T(F): 98.8 (11 Dec 2018 09:00), Max: 102 (10 Dec 2018 23:00)  HR: 46 (11 Dec 2018 09:00) (46 - 84)  BP: 113/50 (11 Dec 2018 09:00) (113/50 - 148/61)  BP(mean): 70 (11 Dec 2018 09:00) (70 - 104)  RR: 20 (11 Dec 2018 09:00) (18 - 28)  SpO2: 97% (11 Dec 2018 09:00) (94% - 97%)    Neurologic Examination:    General: The patient is lethargic and does not open eyes to verbal, tactile or painful stimuli. improved head control since yesterday.   PERRL, +spontaneous horizontal eye movements w/ nystagmus; -corneal reflex   Unable to assess motor/sensory function, strength or cerebellar function given lethargic state  0+ reflexes    Labs:   CBC Full  -  ( 11 Dec 2018 04:16 )  WBC Count : 11.52 K/uL  Hemoglobin : 10.5 g/dL  Hematocrit : 31.0 %  Platelet Count - Automated : 216 K/uL  Mean Cell Volume : 88.6 fL  Mean Cell Hemoglobin : 30.0 pg  Mean Cell Hemoglobin Concentration : 33.9 g/dL  Auto Neutrophil # : x  Auto Lymphocyte # : x  Auto Monocyte # : x  Auto Eosinophil # : x  Auto Basophil # : x  Auto Neutrophil % : x  Auto Lymphocyte % : x  Auto Monocyte % : x  Auto Eosinophil % : x  Auto Basophil % : x    12-11    144  |  107  |  17  ----------------------------<  124<H>  3.7   |  23  |  0.5<L>    Ca    8.7      11 Dec 2018 04:16  Mg     2.4     12-10                Neuroimaging:  NCHCT:     IMPRESSION:    Interval development of findings consistent with diffuse hypoxic/ischemic   brain injury (diffuse loss of gray white differentiation, hypoattenuation   of the deep gray nuclei, diffuse cerebral edema).      VEEG: < from: EEG Monitoring Each 24 hours (12.09.18 @ 10:40) >  Impression  This is an abnormal Video EEG study with generalized slowing consistent   with diffuse cerebral dysfunction. There is no epileptiform activity   noted in the study.    < end of copied text >      Assessment:  This is a 67y Female with h/o diffuse hypoxic/ischemic brain injury w/ generalized slowing on EEG. Patient continues to have spontaneous eye movements showing evidence of preserved pontine function. Patient w/ unchanged neurological exam and no improvement in mental state since discontinuation of sedation.     Plan:   - MRI brain when possible     attending note to follow     12-11-18 @ 10:23 Neurology Consult    Patient is a 67y old  Female who presents with a chief complaint of choking, acute respiratory failure, intubated (11 Dec 2018 09:08)      HPI:  68 y/o F, with PMH listed below including HTN and schizophrenia, lives at [facility], brought to the ED by EMS after cardiac arrest. On day of presentation, she was eating a bagel when she choked and collapsed. Heimlich maneuver was performed and CPR was initiated as she was pulseless. EMS arrived after around 10 mins. ROSC was established after pushing 1 dose of epi. She was intubated on site and brought to ED.     Interval History: patient with anoxic brain injury-  diffuse cerebral edema on CT     PAST MEDICAL & SURGICAL HISTORY:  Depressed  High cholesterol  Tardive dyskinesia  Schizophrenia  GERD (gastroesophageal reflux disease)  No significant past surgical history      FAMILY HISTORY:  Family history of hypertension (Father)      Social History: (-) x 3    Allergies    No Known Allergies    Intolerances        MEDICATIONS  (STANDING):  ampicillin/sulbactam  IVPB      ampicillin/sulbactam  IVPB 3 Gram(s) IV Intermittent once  ampicillin/sulbactam  IVPB 3 Gram(s) IV Intermittent every 6 hours  artificial  tears Solution 1 Drop(s) Both EYES three times a day  chlorhexidine 0.12% Liquid 15 milliLiter(s) Swish and Spit two times a day  chlorhexidine 4% Liquid 1 Application(s) Topical <User Schedule>  docusate sodium Liquid 100 milliGRAM(s) Oral every 8 hours  enoxaparin Injectable 40 milliGRAM(s) SubCutaneous daily  levETIRAcetam  IVPB 1000 milliGRAM(s) IV Intermittent every 12 hours  pantoprazole   Suspension 40 milliGRAM(s) Oral before breakfast    MEDICATIONS  (PRN):  acetaminophen   Tablet .. 650 milliGRAM(s) Oral every 6 hours PRN Temp greater or equal to 38C (100.4F), Moderate Pain (4 - 6)      Review of systems:    Constitutional: No fever, weight loss or fatigue    Eyes: No eye pain or discharge  ENMT:  No difficulty hearing; No sinus or throat pain  Neck: No pain or stiffness  Respiratory: No cough, wheezing, chills or hemoptysis  Cardiovascular: No chest pain, palpitations, shortness of breath, dyspnea on exertion  Gastrointestinal: No abdominal pain, nausea, vomiting or hematemesis; No diarrhea or constipation.   Genitourinary: No dysuria, frequency, hematuria or incontinence  Neurological: As per HPI  Skin: No rashes or lesions   Endocrine: No heat or cold intolerance; No hair loss  Musculoskeletal: No joint pain or swelling  Psychiatric: No depression, anxiety, mood swings  Heme/Lymph: No easy bruising or bleeding gums    Vital Signs Last 24 Hrs  T(C): 37.1 (11 Dec 2018 09:00), Max: 38.9 (10 Dec 2018 23:00)  T(F): 98.8 (11 Dec 2018 09:00), Max: 102 (10 Dec 2018 23:00)  HR: 46 (11 Dec 2018 09:00) (46 - 84)  BP: 113/50 (11 Dec 2018 09:00) (113/50 - 148/61)  BP(mean): 70 (11 Dec 2018 09:00) (70 - 104)  RR: 20 (11 Dec 2018 09:00) (18 - 28)  SpO2: 97% (11 Dec 2018 09:00) (94% - 97%)    Neurologic Examination:    General: The patient is comatosed and does not open eyes to verbal, tactile or painful stimuli.    PERRL, +spontaneous horizontal eye movements w/ nystagmus; -corneal reflex absent bonnie  no Doll's eye response  gag +  0+ reflexes    Labs:   CBC Full  -  ( 11 Dec 2018 04:16 )  WBC Count : 11.52 K/uL  Hemoglobin : 10.5 g/dL  Hematocrit : 31.0 %  Platelet Count - Automated : 216 K/uL  Mean Cell Volume : 88.6 fL  Mean Cell Hemoglobin : 30.0 pg  Mean Cell Hemoglobin Concentration : 33.9 g/dL  Auto Neutrophil # : x  Auto Lymphocyte # : x  Auto Monocyte # : x  Auto Eosinophil # : x  Auto Basophil # : x  Auto Neutrophil % : x  Auto Lymphocyte % : x  Auto Monocyte % : x  Auto Eosinophil % : x  Auto Basophil % : x    12-11    144  |  107  |  17  ----------------------------<  124<H>  3.7   |  23  |  0.5<L>    Ca    8.7      11 Dec 2018 04:16  Mg     2.4     12-10                Neuroimaging:  NCHCT:     IMPRESSION:    Interval development of findings consistent with diffuse hypoxic/ischemic   brain injury (diffuse loss of gray white differentiation, hypoattenuation   of the deep gray nuclei, diffuse cerebral edema).      VEEG: < from: EEG Monitoring Each 24 hours (12.09.18 @ 10:40) >  Impression  This is an abnormal Video EEG study with generalized slowing consistent   with diffuse cerebral dysfunction. There is no epileptiform activity   noted in the study.    < end of copied text >      Assessment:  This is a 67y Female with h/o diffuse hypoxic/ischemic brain injury w/ generalized slowing on EEG. Patient continues to have spontaneous eye movements showing evidence of preserved pontine function. Patient w/ unchanged neurological exam and no improvement in mental state since discontinuation of sedation.     Plan:   - MRI brain when possible     attending note to follow     12-11-18 @ 10:23

## 2018-12-11 NOTE — CONSULT NOTE ADULT - SUBJECTIVE AND OBJECTIVE BOX
REQUESTED OF: DR Espino     CLINICAL ISSUE TO BE EVALUATED BY CONSULTANT: Goals of care         ANNETTE RAMIREZ 67yFemale  HPI:  68 y/o F, with PMH listed below including HTN and schizophrenia, lives at [facility], brought to the ED by EMS after cardiac arrest. On day of presentation, she was eating a bagel when she choked and collapsed. Heimlich maneuver was performed and CPR was initiated as she was pulseless. EMS arrived after around 10 mins. ROSC was established after pushing 1 dose of epi. She was intubated on site and brought to ED.   spoke to patients cousin's wife (cousin is HCP), patient is a chronic smoker 1ppd for many years. (07 Dec 2018 14:30)        PAST MEDICAL & SURGICAL HISTORY:  Depressed  High cholesterol  Tardive dyskinesia  Schizophrenia  GERD (gastroesophageal reflux disease)  No significant past surgical history        PHYSICAL EXAM:      Constitutional: lethargic, respond to painful stimuli    Eyes: upwards gaze, erythematous sclera     Respiratory: vented, b/l breath sound    Cardiovascular: S1S2 RRR    Gastrointestinal: Soft non distended    Extremities: does not move all lower extremities     Neurological: withdraws to Upper extremities painful stimuli     Skin: WNL             T(C): 35.6, Max: 38.9 (23:00)  HR: 56 (46 - 84)  BP: 152/68 (113/50 - 152/68)  RR: 28 (18 - 28)  SpO2: 94% (94% - 100%)      LABS/STUDIES:  12-11    144  |  107  |  17  ----------------------------<  124<H>  3.7   |  23  |  0.5<L>    Ca    8.7      11 Dec 2018 04:16  Mg     2.4     12-10                              10.5   11.52 )-----------( 216      ( 11 Dec 2018 04:16 )             31.0       MEDICATIONS  (STANDING):  ampicillin/sulbactam  IVPB      ampicillin/sulbactam  IVPB 3 Gram(s) IV Intermittent every 6 hours  artificial  tears Solution 1 Drop(s) Both EYES three times a day  chlorhexidine 0.12% Liquid 15 milliLiter(s) Swish and Spit two times a day  chlorhexidine 4% Liquid 1 Application(s) Topical <User Schedule>  docusate sodium Liquid 100 milliGRAM(s) Oral every 8 hours  enoxaparin Injectable 40 milliGRAM(s) SubCutaneous daily  levETIRAcetam  IVPB 1000 milliGRAM(s) IV Intermittent every 12 hours  pantoprazole   Suspension 40 milliGRAM(s) Oral before breakfast    MEDICATIONS  (PRN):  acetaminophen   Tablet .. 650 milliGRAM(s) Oral every 6 hours PRN Temp greater or equal to 38C (100.4F), Moderate Pain (4 - 6)        [place] Symptom Assesment Scale      PPS (Palliative Performance Scale): REQUESTED OF: DR Espino     CLINICAL ISSUE TO BE EVALUATED BY CONSULTANT: Goals of care         ANNETTE RAMIREZ 67yFemale  HPI:  66 y/o F, with PMH listed below including HTN and schizophrenia, lives at [facility], brought to the ED by EMS after cardiac arrest. On day of presentation, she was eating a bagel when she choked and collapsed. Heimlich maneuver was performed and CPR was initiated as she was pulseless. EMS arrived after around 10 mins. ROSC was established after pushing 1 dose of epi. She was intubated on site and brought to ED.   spoke to patients cousin's wife (cousin is HCP), patient is a chronic smoker 1ppd for many years. (07 Dec 2018 14:30)        PAST MEDICAL & SURGICAL HISTORY:  Depressed  High cholesterol  Tardive dyskinesia  Schizophrenia  GERD (gastroesophageal reflux disease)  No significant past surgical history        PHYSICAL EXAM:      Constitutional: lethargic, respond to painful stimuli    Eyes: upwards gaze, erythematous sclera     Respiratory: vented, b/l breath sound    Cardiovascular: S1S2 RRR    Gastrointestinal: Soft non distended    Extremities: does not move all lower extremities     Neurological: withdraws to Upper extremities painful stimuli     Skin: WNL             T(C): 35.6, Max: 38.9 (23:00)  HR: 56 (46 - 84)  BP: 152/68 (113/50 - 152/68)  RR: 28 (18 - 28)  SpO2: 94% (94% - 100%)      LABS/STUDIES:  12-11    144  |  107  |  17  ----------------------------<  124<H>  3.7   |  23  |  0.5<L>    Ca    8.7      11 Dec 2018 04:16  Mg     2.4     12-10                              10.5   11.52 )-----------( 216      ( 11 Dec 2018 04:16 )             31.0       MEDICATIONS  (STANDING):  ampicillin/sulbactam  IVPB      ampicillin/sulbactam  IVPB 3 Gram(s) IV Intermittent every 6 hours  artificial  tears Solution 1 Drop(s) Both EYES three times a day  chlorhexidine 0.12% Liquid 15 milliLiter(s) Swish and Spit two times a day  chlorhexidine 4% Liquid 1 Application(s) Topical <User Schedule>  docusate sodium Liquid 100 milliGRAM(s) Oral every 8 hours  enoxaparin Injectable 40 milliGRAM(s) SubCutaneous daily  levETIRAcetam  IVPB 1000 milliGRAM(s) IV Intermittent every 12 hours  pantoprazole   Suspension 40 milliGRAM(s) Oral before breakfast    MEDICATIONS  (PRN):  acetaminophen   Tablet .. 650 milliGRAM(s) Oral every 6 hours PRN Temp greater or equal to 38C (100.4F), Moderate Pain (4 - 6)        [place] Symptom Assesment Scale      PPS (Palliative Performance Scale): 10

## 2018-12-12 RX ORDER — MORPHINE SULFATE 50 MG/1
4 CAPSULE, EXTENDED RELEASE ORAL
Qty: 0 | Refills: 0 | Status: DISCONTINUED | OUTPATIENT
Start: 2018-12-12 | End: 2018-12-12

## 2018-12-12 RX ORDER — MORPHINE SULFATE 50 MG/1
4 CAPSULE, EXTENDED RELEASE ORAL
Qty: 100 | Refills: 0 | Status: DISCONTINUED | OUTPATIENT
Start: 2018-12-12 | End: 2018-12-14

## 2018-12-12 RX ADMIN — MORPHINE SULFATE 4 MILLIGRAM(S): 50 CAPSULE, EXTENDED RELEASE ORAL at 17:49

## 2018-12-12 RX ADMIN — ENOXAPARIN SODIUM 40 MILLIGRAM(S): 100 INJECTION SUBCUTANEOUS at 12:32

## 2018-12-12 RX ADMIN — AMPICILLIN SODIUM AND SULBACTAM SODIUM 200 GRAM(S): 250; 125 INJECTION, POWDER, FOR SUSPENSION INTRAMUSCULAR; INTRAVENOUS at 17:46

## 2018-12-12 RX ADMIN — AMPICILLIN SODIUM AND SULBACTAM SODIUM 200 GRAM(S): 250; 125 INJECTION, POWDER, FOR SUSPENSION INTRAMUSCULAR; INTRAVENOUS at 12:34

## 2018-12-12 RX ADMIN — AMPICILLIN SODIUM AND SULBACTAM SODIUM 200 GRAM(S): 250; 125 INJECTION, POWDER, FOR SUSPENSION INTRAMUSCULAR; INTRAVENOUS at 23:00

## 2018-12-12 RX ADMIN — MORPHINE SULFATE 4 MG/HR: 50 CAPSULE, EXTENDED RELEASE ORAL at 18:47

## 2018-12-12 RX ADMIN — Medication 1 APPLICATION(S): at 05:01

## 2018-12-12 RX ADMIN — Medication 1 DROP(S): at 21:15

## 2018-12-12 RX ADMIN — Medication 1 DROP(S): at 05:01

## 2018-12-12 RX ADMIN — LEVETIRACETAM 400 MILLIGRAM(S): 250 TABLET, FILM COATED ORAL at 17:46

## 2018-12-12 RX ADMIN — CHLORHEXIDINE GLUCONATE 15 MILLILITER(S): 213 SOLUTION TOPICAL at 05:01

## 2018-12-12 RX ADMIN — CHLORHEXIDINE GLUCONATE 1 APPLICATION(S): 213 SOLUTION TOPICAL at 12:32

## 2018-12-12 RX ADMIN — MORPHINE SULFATE 4 MILLIGRAM(S): 50 CAPSULE, EXTENDED RELEASE ORAL at 18:48

## 2018-12-12 RX ADMIN — CHLORHEXIDINE GLUCONATE 15 MILLILITER(S): 213 SOLUTION TOPICAL at 17:47

## 2018-12-12 RX ADMIN — Medication 100 MILLIGRAM(S): at 05:03

## 2018-12-12 RX ADMIN — LEVETIRACETAM 400 MILLIGRAM(S): 250 TABLET, FILM COATED ORAL at 05:00

## 2018-12-12 RX ADMIN — Medication 2 MILLIGRAM(S): at 15:44

## 2018-12-12 RX ADMIN — Medication 1 APPLICATION(S): at 17:47

## 2018-12-12 RX ADMIN — MORPHINE SULFATE 4 MILLIGRAM(S): 50 CAPSULE, EXTENDED RELEASE ORAL at 16:30

## 2018-12-12 RX ADMIN — PANTOPRAZOLE SODIUM 40 MILLIGRAM(S): 20 TABLET, DELAYED RELEASE ORAL at 05:03

## 2018-12-12 RX ADMIN — AMPICILLIN SODIUM AND SULBACTAM SODIUM 200 GRAM(S): 250; 125 INJECTION, POWDER, FOR SUSPENSION INTRAMUSCULAR; INTRAVENOUS at 05:01

## 2018-12-12 RX ADMIN — Medication 1 DROP(S): at 15:42

## 2018-12-12 RX ADMIN — MORPHINE SULFATE 4 MILLIGRAM(S): 50 CAPSULE, EXTENDED RELEASE ORAL at 15:43

## 2018-12-12 NOTE — PROGRESS NOTE ADULT - SUBJECTIVE AND OBJECTIVE BOX
Patient is a 67y old  Female who presents with a chief complaint of choking, acute respiratory failure, intubated (11 Dec 2018 09:08)      HPI:  66 y/o F, with PMH listed below including HTN and schizophrenia, lives at [facility], brought to the ED by EMS after cardiac arrest. On day of presentation, she was eating a bagel when she choked and collapsed. Heimlich maneuver was performed and CPR was initiated as she was pulseless. EMS arrived after around 10 mins. ROSC was established after pushing 1 dose of epi. She was intubated on site and brought to ED.     ICU Vital Signs Last 24 Hrs  T(C): 35.7 (12 Dec 2018 07:05), Max: 36.4 (11 Dec 2018 23:57)  T(F): 96.3 (12 Dec 2018 07:05), Max: 97.6 (11 Dec 2018 23:57)  HR: 48 (12 Dec 2018 08:25) (48 - 91)  BP: 116/59 (12 Dec 2018 07:05) (115/70 - 176/93)  BP(mean): 84 (12 Dec 2018 07:05) (80 - 127)  ABP: --  ABP(mean): --  RR: 23 (12 Dec 2018 07:05) (23 - 27)  SpO2: 95% (12 Dec 2018 08:25) (95% - 98%)          MEDICATIONS  (STANDING):  ampicillin/sulbactam  IVPB      ampicillin/sulbactam  IVPB 3 Gram(s) IV Intermittent once  ampicillin/sulbactam  IVPB 3 Gram(s) IV Intermittent every 6 hours  artificial  tears Solution 1 Drop(s) Both EYES three times a day  chlorhexidine 0.12% Liquid 15 milliLiter(s) Swish and Spit two times a day  chlorhexidine 4% Liquid 1 Application(s) Topical <User Schedule>  docusate sodium Liquid 100 milliGRAM(s) Oral every 8 hours  enoxaparin Injectable 40 milliGRAM(s) SubCutaneous daily  levETIRAcetam  IVPB 1000 milliGRAM(s) IV Intermittent every 12 hours  pantoprazole   Suspension 40 milliGRAM(s) Oral before breakfast        VS     Vital Signs Last 24 Hrs  T(C): 37.1 (11 Dec 2018 09:00), Max: 38.9 (10 Dec 2018 23:00)  T(F): 98.8 (11 Dec 2018 09:00), Max: 102 (10 Dec 2018 23:00)  HR: 46 (11 Dec 2018 09:00) (46 - 84)  BP: 113/50 (11 Dec 2018 09:00) (113/50 - 148/61)  BP(mean): 70 (11 Dec 2018 09:00) (70 - 104)  RR: 20 (11 Dec 2018 09:00) (18 - 28)  SpO2: 97% (11 Dec 2018 09:00) (94% - 97%)          labs:                           10.5   11.52 )-----------( 216      ( 11 Dec 2018 04:16 )             31.0     12-11    144  |  107  |  17  ----------------------------<  124<H>  3.7   |  23  |  0.5<L>    Ca    8.7      11 Dec 2018 04:16                  Neuroimaging:  NCHCT:     IMPRESSION:    Interval development of findings consistent with diffuse hypoxic/ischemic   brain injury (diffuse loss of gray white differentiation, hypoattenuation   of the deep gray nuclei, diffuse cerebral edema).      VEEG: < from: EEG Monitoring Each 24 hours (12.09.18 @ 10:40) >  Impression  This is an abnormal Video EEG study with generalized slowing consistent   with diffuse cerebral dysfunction. There is no epileptiform activity   noted in the study.    < end of copied text >      Assessment:  This is a 67y Female with h/o diffuse hypoxic/ischemic brain injury w/ generalized slowing on EEG. Patient continues to have spontaneous eye movements showing evidence of preserved pontine function. Patient w/ unchanged neurological exam and no improvement in mental state since discontinuation of sedation.     Plan:   - MRI brain when possible     attending note to follow     12-11-18 @ 10:23 Patient is a 67y old  Female who presents with a chief complaint of choking, acute respiratory failure, intubated (11 Dec 2018 09:08)      HPI:  66 y/o F, with PMH listed below including HTN and schizophrenia, lives at [facility], brought to the ED by EMS after cardiac arrest. On day of presentation, she was eating a bagel when she choked and collapsed. Heimlich maneuver was performed and CPR was initiated as she was pulseless. EMS arrived after around 10 mins. ROSC was established after pushing 1 dose of epi. She was intubated on site and brought to ED.     ICU Vital Signs Last 24 Hrs  T(C): 35.7 (12 Dec 2018 07:05), Max: 36.4 (11 Dec 2018 23:57)  T(F): 96.3 (12 Dec 2018 07:05), Max: 97.6 (11 Dec 2018 23:57)  HR: 48 (12 Dec 2018 08:25) (48 - 91)  BP: 116/59 (12 Dec 2018 07:05) (115/70 - 176/93)  BP(mean): 84 (12 Dec 2018 07:05) (80 - 127)  ABP: --  ABP(mean): --  RR: 23 (12 Dec 2018 07:05) (23 - 27)  SpO2: 95% (12 Dec 2018 08:25) (95% - 98%)      PE:     	PHYSICAL EXAM:  	GEN: vented, intubated, sedated  	HEENT: tongue movements- chronic as per patients family  	LUNGS: vented BS to auscultation bilaterally   	HEART: S1/S2 present. RRR.   	ABD: Soft, non-tender, non-distended  	EXT: no edema  NEURO: sedated, vented      MEDICATIONS  (STANDING):  ampicillin/sulbactam  IVPB      ampicillin/sulbactam  IVPB 3 Gram(s) IV Intermittent once  ampicillin/sulbactam  IVPB 3 Gram(s) IV Intermittent every 6 hours  artificial  tears Solution 1 Drop(s) Both EYES three times a day  chlorhexidine 0.12% Liquid 15 milliLiter(s) Swish and Spit two times a day  chlorhexidine 4% Liquid 1 Application(s) Topical <User Schedule>  docusate sodium Liquid 100 milliGRAM(s) Oral every 8 hours  enoxaparin Injectable 40 milliGRAM(s) SubCutaneous daily  levETIRAcetam  IVPB 1000 milliGRAM(s) IV Intermittent every 12 hours  pantoprazole   Suspension 40 milliGRAM(s) Oral before breakfast        VS     Vital Signs Last 24 Hrs  T(C): 37.1 (11 Dec 2018 09:00), Max: 38.9 (10 Dec 2018 23:00)  T(F): 98.8 (11 Dec 2018 09:00), Max: 102 (10 Dec 2018 23:00)  HR: 46 (11 Dec 2018 09:00) (46 - 84)  BP: 113/50 (11 Dec 2018 09:00) (113/50 - 148/61)  BP(mean): 70 (11 Dec 2018 09:00) (70 - 104)  RR: 20 (11 Dec 2018 09:00) (18 - 28)  SpO2: 97% (11 Dec 2018 09:00) (94% - 97%)          labs:                           10.5   11.52 )-----------( 216      ( 11 Dec 2018 04:16 )             31.0     12-11    144  |  107  |  17  ----------------------------<  124<H>  3.7   |  23  |  0.5<L>    Ca    8.7      11 Dec 2018 04:16                  Neuroimaging:  NCHCT:     IMPRESSION:    Interval development of findings consistent with diffuse hypoxic/ischemic   brain injury (diffuse loss of gray white differentiation, hypoattenuation   of the deep gray nuclei, diffuse cerebral edema).      VEEG: < from: EEG Monitoring Each 24 hours (12.09.18 @ 10:40) >  Impression  This is an abnormal Video EEG study with generalized slowing consistent   with diffuse cerebral dysfunction. There is no epileptiform activity   noted in the study.    < end of copied text >      Assessment:  This is a 67y Female with h/o diffuse hypoxic/ischemic brain injury w/ generalized slowing on EEG. Patient continues to have spontaneous eye movements showing evidence of preserved pontine function. Patient w/ unchanged neurological exam and no improvement in mental state since discontinuation of sedation.     Plan:   - MRI brain when possible     attending note to follow     12-11-18 @ 10:23

## 2018-12-12 NOTE — PROGRESS NOTE ADULT - ATTENDING COMMENTS
67 yr old woman with anoxic brain injury- Cts from 12/8 and 12/9 reveiwed- significant worsening of cerebral edema within 24 hours-  patient is deeply comatosed off sedation-  no corneal response- eyes deviated upwards with roving eye movements-  overall prognosis for meaningful neurological recovery appears poor    patient being transferred to vent unit with palliative care services.
66 y/o Female with PMH of  HTN and schizophrenia, lives at [facility] assisted living, brought to the ED by EMS after she choked s/p cardiac resuscitation possible anoxic brain injury.  No epileptiform activity on VEEG.    Plan:  -  continue keppra 1g Q12  - keep Mg > 2.0  - continue sedation for now  - continue supportive care  - wean sedation as tolerated  - will reassess once sedation off
67 yr old woman with anoxic brain injury- Cts from 12/8 and 12/9 reveiwed- significant worsening of cerebral edema within 24 hours-  patient is deeply comatosed off sedation-  no corneal response- eyes deviated upwards with roving eye movements-  overall prognosis for meaningful neurological recovery appears poor    patient being transferred to vent unit with palliative care services.

## 2018-12-12 NOTE — GOALS OF CARE CONVERSATION - PERSONAL ADVANCE DIRECTIVE - CONVERSATION DETAILS
Patient is a 66 y/o female with diagnosis: CARDIAC ARREST; CHOKING DUE TO FOOD (REGURGITATED).  Palliative care team met with patient's surrogate/niece Rhianna Rosado at bedside.  Patient's niece verbalized understanding of patient's very serious medical conditions. Patients niece relating she has spoken to the rest of the family and they are opting for a palliative liberation. Patient's niece is aware that this may result in patient's death, and wishes for her to remain comfortable and pain free.        No further mechanical ventilation, intubation.  No further blood work or finger sticks. No IV hydration or nutrition.  No IVabx. Plan for Palliative liberation followed by complete comfort measures.  MOLST completed reflecting surrogate wishes.  Palliative care team will continue to monitor and provide support.

## 2018-12-12 NOTE — PROGRESS NOTE ADULT - SUBJECTIVE AND OBJECTIVE BOX
67yFemale with diagnosis: CARDIAC ARREST; CHOKING DUE TO FOOD (REGURGITATED)      Patient seen, no improvement in status. Remains unresponsive, vent dependent.       PHYSICAL EXAM  unchanged    T(C): , Max: 36.4 (23:57)  T(F): 96.3  HR: 48 (48 - 91)  BP: 116/59 (115/70 - 176/93)  RR: 23 (23 - 27)  SpO2: 95% (95% - 98%)              LABS:                          10.5   11.52 )-----------( 216      ( 11 Dec 2018 04:16 )             31.0                                                                                      12-11    144  |  107  |  17  ----------------------------<  124<H>  3.7   |  23  |  0.5<L>    Ca    8.7      11 Dec 2018 04:16                                                        MEDICATIONS  (STANDING):  ampicillin/sulbactam  IVPB      ampicillin/sulbactam  IVPB 3 Gram(s) IV Intermittent every 6 hours  artificial  tears Solution 1 Drop(s) Both EYES three times a day  chlorhexidine 0.12% Liquid 15 milliLiter(s) Swish and Spit two times a day  chlorhexidine 4% Liquid 1 Application(s) Topical <User Schedule>  docusate sodium Liquid 100 milliGRAM(s) Oral every 8 hours  enoxaparin Injectable 40 milliGRAM(s) SubCutaneous daily  levETIRAcetam  IVPB 1000 milliGRAM(s) IV Intermittent every 12 hours  morphine  - Injectable 4 milliGRAM(s) IV Push every 15 minutes  morphine  Infusion 4 mG/Hr (4 mL/Hr) IV Continuous <Continuous>  pantoprazole   Suspension 40 milliGRAM(s) Oral before breakfast  petrolatum Ophthalmic Ointment 1 Application(s) Both EYES every 12 hours    MEDICATIONS  (PRN):  acetaminophen   Tablet .. 650 milliGRAM(s) Oral every 6 hours PRN Temp greater or equal to 38C (100.4F), Moderate Pain (4 - 6)  LORazepam   Injectable 2 milliGRAM(s) IntraMuscular every 1 hour PRN Agitation

## 2018-12-12 NOTE — PROGRESS NOTE ADULT - ASSESSMENT
67yFemale being evaluated for goals of care.    Pt's surrogate (Rhianna Rosado) at bedside. Family decision made to withdraw vent support today in pursuit of comfort care/end of life care.      Recommendations: 67yFemale being evaluated for goals of care.    Pt's surrogate (Rhianna Rosado) at bedside. Pt failed multiple SBT trials as she immediately becomes bradycardic. Family decision made to withdraw vent support today in pursuit of comfort care/end of life care.   Family aware of likely progression to resp failure, cardiac arrest and death following vent withdrawal, plan is to focus on pt's comfort as she transitions to through end stages of life.  All questions answered in detail  Support provided      Recommendations:  start morphine 4mg IVP with gtt PRN resp distress  ativan 2mg IV q1h PRN agitation  DNR/DNI/CMO      Death will likely be imminent  Family at bedside  [phone]  We will follow  [phone]

## 2018-12-13 VITALS
TEMPERATURE: 96 F | SYSTOLIC BLOOD PRESSURE: 86 MMHG | HEART RATE: 61 BPM | RESPIRATION RATE: 23 BRPM | DIASTOLIC BLOOD PRESSURE: 48 MMHG

## 2018-12-13 RX ORDER — ROBINUL 0.2 MG/ML
0.4 INJECTION INTRAMUSCULAR; INTRAVENOUS ONCE
Qty: 0 | Refills: 0 | Status: COMPLETED | OUTPATIENT
Start: 2018-12-13 | End: 2018-12-13

## 2018-12-13 RX ORDER — ROBINUL 0.2 MG/ML
0.2 INJECTION INTRAMUSCULAR; INTRAVENOUS EVERY 6 HOURS
Qty: 0 | Refills: 0 | Status: DISCONTINUED | OUTPATIENT
Start: 2018-12-13 | End: 2018-12-14

## 2018-12-13 RX ADMIN — LEVETIRACETAM 400 MILLIGRAM(S): 250 TABLET, FILM COATED ORAL at 05:03

## 2018-12-13 RX ADMIN — Medication 1 DROP(S): at 21:09

## 2018-12-13 RX ADMIN — ROBINUL 0.2 MILLIGRAM(S): 0.2 INJECTION INTRAMUSCULAR; INTRAVENOUS at 23:30

## 2018-12-13 RX ADMIN — Medication 1 DROP(S): at 14:23

## 2018-12-13 RX ADMIN — ROBINUL 0.2 MILLIGRAM(S): 0.2 INJECTION INTRAMUSCULAR; INTRAVENOUS at 17:49

## 2018-12-13 RX ADMIN — Medication 2 MILLIGRAM(S): at 05:51

## 2018-12-13 RX ADMIN — MORPHINE SULFATE 4 MG/HR: 50 CAPSULE, EXTENDED RELEASE ORAL at 12:21

## 2018-12-13 RX ADMIN — LEVETIRACETAM 400 MILLIGRAM(S): 250 TABLET, FILM COATED ORAL at 17:49

## 2018-12-13 RX ADMIN — AMPICILLIN SODIUM AND SULBACTAM SODIUM 200 GRAM(S): 250; 125 INJECTION, POWDER, FOR SUSPENSION INTRAMUSCULAR; INTRAVENOUS at 05:03

## 2018-12-13 RX ADMIN — Medication 1 APPLICATION(S): at 17:49

## 2018-12-13 RX ADMIN — Medication 1 DROP(S): at 05:03

## 2018-12-13 RX ADMIN — Medication 1 APPLICATION(S): at 05:04

## 2018-12-13 RX ADMIN — ROBINUL 0.4 MILLIGRAM(S): 0.2 INJECTION INTRAMUSCULAR; INTRAVENOUS at 11:04

## 2018-12-13 RX ADMIN — ROBINUL 0.2 MILLIGRAM(S): 0.2 INJECTION INTRAMUSCULAR; INTRAVENOUS at 12:00

## 2018-12-13 RX ADMIN — CHLORHEXIDINE GLUCONATE 15 MILLILITER(S): 213 SOLUTION TOPICAL at 05:03

## 2018-12-13 NOTE — PROGRESS NOTE ADULT - REASON FOR ADMISSION
choking, acute respiratory failure, intubated

## 2018-12-13 NOTE — PROGRESS NOTE ADULT - ASSESSMENT
Ass/Plan: Anoxic brain injury   -Poor prognosis   -pt was palliatively extubated yesterday   - she is comfort care on morphine gtt.  Cont ativan.  Can d/c abx.  No further lab draws.  Cont comfort measures.  She is DNR/DNI

## 2018-12-13 NOTE — PROGRESS NOTE ADULT - ASSESSMENT
67yFemale being evaluated for comfort care.      Recommendations:  cont morphine gtt @4mg/hr  morphine 4mg IVP q1h PRN resp distress/pain  ativan PRN add robinul 0.2mg IV q6h for secretions  comfort measures only with end of life care    Pt will likely  in hospital. Family aware.    We will follow  [phone]

## 2018-12-13 NOTE — PROGRESS NOTE ADULT - SUBJECTIVE AND OBJECTIVE BOX
67yFemale with diagnosis: CARDIAC ARREST; CHOKING DUE TO FOOD (REGURGITATED)      Patient seen, s/p extubation yesterday, remains unresponsive, ++gurgling. Resp distress well managed with morphine gtt.       PHYSICAL EXAM  uncahnged      T(C): , Max: 35.6 (07:20)  T(F): 96  HR: 61 (61 - 61)  BP: 86/48 (86/48 - 86/48)  RR: 23 (23 - 23)  SpO2: --              LABS:                                                                                                                                               MEDICATIONS  (STANDING):  artificial  tears Solution 1 Drop(s) Both EYES three times a day  glycopyrrolate Injectable 0.2 milliGRAM(s) IV Push every 6 hours  levETIRAcetam  IVPB 1000 milliGRAM(s) IV Intermittent every 12 hours  morphine  Infusion 4 mG/Hr (4 mL/Hr) IV Continuous <Continuous>  petrolatum Ophthalmic Ointment 1 Application(s) Both EYES every 12 hours    MEDICATIONS  (PRN):  acetaminophen   Tablet .. 650 milliGRAM(s) Oral every 6 hours PRN Temp greater or equal to 38C (100.4F), Moderate Pain (4 - 6)  LORazepam   Injectable 2 milliGRAM(s) IV Push every 1 hour PRN Agitation

## 2018-12-13 NOTE — PROGRESS NOTE ADULT - SUBJECTIVE AND OBJECTIVE BOX
Interval Events: Pt was extubated.  Pt is comfort care on morphine gtt.    Patient seen and examined at bedside.      Vital Signs Last 24 Hrs  T(C): 35.6 (13 Dec 2018 07:20), Max: 35.6 (13 Dec 2018 07:20)  T(F): 96 (13 Dec 2018 07:20), Max: 96 (13 Dec 2018 07:20)  HR: 61 (13 Dec 2018 07:20) (61 - 61)  BP: 86/48 (13 Dec 2018 07:20) (86/48 - 86/48)  BP(mean): 62 (13 Dec 2018 07:20) (62 - 62)  RR: 23 (13 Dec 2018 07:20) (23 - 23)  SpO2: --    12-12 @ 07:01  -  12-13 @ 07:00  --------------------------------------------------------  IN: 248 mL / OUT: 4 mL / NET: 244 mL          PHYSICAL EXAM:    PHYSICAL EXAM:  	GEN: Pt is not awake.  She does not appear to be in any distress  	HEENT: tongue movements- chronic as per patients family  	LUNGS: + Rhonchi  	HEART: S1/S2 present. RRR.   	ABD: Soft, non-tender, non-distended  	EXT: no edema  NEURO: unresponsive

## 2018-12-13 NOTE — CHART NOTE - NSCHARTNOTEFT_GEN_A_CORE
Registered dietitian limited note    Pt. due for follow up today. However, pt. comfort measures only at this time. No artificial nutrition/hydration. No RD intervention. RD to sign off.

## 2018-12-13 NOTE — PROGRESS NOTE ADULT - PROVIDER SPECIALTY LIST ADULT
Critical Care
Neurology
Palliative Care
Palliative Care
Pulmonology
Pulmonology
Critical Care

## 2018-12-14 LAB
CULTURE RESULTS: SIGNIFICANT CHANGE UP
CULTURE RESULTS: SIGNIFICANT CHANGE UP
SPECIMEN SOURCE: SIGNIFICANT CHANGE UP
SPECIMEN SOURCE: SIGNIFICANT CHANGE UP

## 2018-12-14 NOTE — DISCHARGE NOTE FOR THE EXPIRED PATIENT - HOSPITAL COURSE
68 y/o F, with PMH listed below including HTN and schizophrenia, lives at [facility], brought to the ED by EMS after cardiac arrest. On day of presentation, she was eating a bagel when she choked and collapsed. Heimlich maneuver was performed and CPR was initiated as she was pulseless. EMS arrived after around 10 mins. ROSC was established after pushing 1 dose of epi. She was intubated on site and brought to ED. Pt is found to have anoxic brain injury and placed under comfort care.

## 2018-12-14 NOTE — CHART NOTE - NSCHARTNOTEFT_GEN_A_CORE
Spoke with medical examiner Nathan, the patient is considered as ME case. Clinical summary sheet is faxed to .    [facility] Referral# [id number]

## 2018-12-20 DIAGNOSIS — I10 ESSENTIAL (PRIMARY) HYPERTENSION: ICD-10-CM

## 2018-12-20 DIAGNOSIS — E78.5 HYPERLIPIDEMIA, UNSPECIFIED: ICD-10-CM

## 2018-12-20 DIAGNOSIS — T44.995A ADVERSE EFFECT OF OTHER DRUG PRIMARILY AFFECTING THE AUTONOMIC NERVOUS SYSTEM, INITIAL ENCOUNTER: ICD-10-CM
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
## 2018-12-20 DIAGNOSIS — G40.409 OTHER GENERALIZED EPILEPSY AND EPILEPTIC SYNDROMES, NOT INTRACTABLE, WITHOUT STATUS EPILEPTICUS: ICD-10-CM

## 2018-12-20 DIAGNOSIS — T17.220A FOOD IN PHARYNX CAUSING ASPHYXIATION, INITIAL ENCOUNTER: ICD-10-CM

## 2018-12-20 DIAGNOSIS — G24.01 DRUG INDUCED SUBACUTE DYSKINESIA: ICD-10-CM

## 2018-12-20 DIAGNOSIS — J96.01 ACUTE RESPIRATORY FAILURE WITH HYPOXIA: ICD-10-CM

## 2018-12-20 DIAGNOSIS — Z66 DO NOT RESUSCITATE: ICD-10-CM
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
## 2018-12-20 DIAGNOSIS — G93.6 CEREBRAL EDEMA: ICD-10-CM

## 2018-12-20 DIAGNOSIS — K21.9 GASTRO-ESOPHAGEAL REFLUX DISEASE WITHOUT ESOPHAGITIS: ICD-10-CM
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
## 2018-12-20 DIAGNOSIS — F17.210 NICOTINE DEPENDENCE, CIGARETTES, UNCOMPLICATED: ICD-10-CM

## 2018-12-20 DIAGNOSIS — G93.1 ANOXIC BRAIN DAMAGE, NOT ELSEWHERE CLASSIFIED: ICD-10-CM

## 2018-12-20 DIAGNOSIS — J69.0 PNEUMONITIS DUE TO INHALATION OF FOOD AND VOMIT: ICD-10-CM

## 2018-12-20 DIAGNOSIS — F20.9 SCHIZOPHRENIA, UNSPECIFIED: ICD-10-CM

## 2018-12-20 DIAGNOSIS — F32.9 MAJOR DEPRESSIVE DISORDER, SINGLE EPISODE, UNSPECIFIED: ICD-10-CM

## 2018-12-20 DIAGNOSIS — I46.8 CARDIAC ARREST DUE TO OTHER UNDERLYING CONDITION: ICD-10-CM

## 2021-06-17 NOTE — PATIENT PROFILE ADULT - NSPROGENANESREACTION_GEN_A_NUR
patient calling to check status of lab order   WAYNE pt unresponsive WAYNE pt unresponsive/no previous reaction

## 2023-10-20 NOTE — ED ADULT NURSE NOTE - BREATH SOUNDS, MLM
Clear